# Patient Record
Sex: FEMALE | Race: WHITE | Employment: OTHER | URBAN - METROPOLITAN AREA
[De-identification: names, ages, dates, MRNs, and addresses within clinical notes are randomized per-mention and may not be internally consistent; named-entity substitution may affect disease eponyms.]

---

## 2017-01-04 ENCOUNTER — THERAPY VISIT (OUTPATIENT)
Dept: OCCUPATIONAL THERAPY | Facility: CLINIC | Age: 72
End: 2017-01-04
Payer: COMMERCIAL

## 2017-01-04 DIAGNOSIS — M25.631 WRIST STIFFNESS, RIGHT: Primary | ICD-10-CM

## 2017-01-04 DIAGNOSIS — M25.531 RIGHT WRIST PAIN: ICD-10-CM

## 2017-01-04 PROCEDURE — 97022 WHIRLPOOL THERAPY: CPT | Mod: GO | Performed by: OCCUPATIONAL THERAPIST

## 2017-01-04 PROCEDURE — 97140 MANUAL THERAPY 1/> REGIONS: CPT | Mod: GO | Performed by: OCCUPATIONAL THERAPIST

## 2017-01-04 NOTE — PROGRESS NOTES
SOAP Note - Hand Therapy - Objective Information    Current Date:  2017  Referring Physician: Dr. Hakan Samayoa Marin is a 71 year old right hand dominant female.    Diagnosis:Right Distal Radius Fracture  DOI:  10/24/16  DOS:  10/27/16  Procedure: ORIF of Right Distal Radius Fracture  Post: 7w 5d    Patient reports symptoms of pain, stiffness/loss of motion, weakness/loss of strength and edema of the right wrist which occurred due to a fall in her driveway.    S:  Subjective changes as noted by patient:  I think I have more motion  Functional changes noted by patient: less difficulty with some household tasks    S: Most of it seems to be due to the scar tissue area there  O:  Pain:    VAS(0-10) 16   At Rest:  0-210 0/10    With Use:  3/10 3/10 3/10     Report of Pain:  Location: right wrist and hand   Description:  Burning, Sharp  Frequency: Occasional  Duration:  Worse in pm.  Exacerbated by: Lifting, gripping, twisting, pulling,   Relieved by:  rest, massage  Progression: gradually improving    ROM:  Left Right Right Right      Wrist AROM  (PROM) 16      70 Ext 45 60 65      85 Flex 40 60 60        35 RD 20 20       50 UD 20 40       80 Sup 65 80       90 Pron 60 75         Strength:   (Measured in pounds)   16      Trials Right Left Right Left Right Left  Right Left   1  2  3  Av  14  15  16 59  45  47  47           Lat Pinch 16      Trials Right Left Right Left Right Left  Right Left   1  2  3  Av 12           3 Pt.  Pinch 16      Trials Right Left Right Left Right Left  Right Left   1  2  3  Av 11           Edema (circumference)   Distal Wrist Crease 16                Right 16                 Left 14.8          Edema:  []     None   [x]     Mild    []     Moderate      []     Severe                  Location: Volar MCP and P1     Scar:  []    NA           [x]  Adherent      []   Non-adherent       []   Raised      []   Flattened         Volar wrist,  Hypersensitive    Palpation:  []     Normal        [x]   Tender       []  Mild         [x]   Moderate []   Severe     Location: Distal wrist and CMC joint  Assessment  Pt progressing and returning to light functional use.  Edema is still noted across volar MCP and P1, lymphedema massage started on HP.   Please refer to the daily flowsheet for treatment provided today.     Home Program:   Overhead fisting  Warmth  Scar mobilization  AROM of wrist and forearm all planes  PROM for wrist E/F and S/P   strengthening  Avoid activities that exacerbate pain   Volar hand lymph massage    Next Visit:   Fluido  A/PROM of wrist  Strengthening as tolerated      Please see the documentation flowsheet for the details of todays treatment session and the Goal flowsheet for goal details.

## 2017-01-10 ENCOUNTER — THERAPY VISIT (OUTPATIENT)
Dept: OCCUPATIONAL THERAPY | Facility: CLINIC | Age: 72
End: 2017-01-10
Payer: COMMERCIAL

## 2017-01-10 DIAGNOSIS — M25.631 WRIST STIFFNESS, RIGHT: ICD-10-CM

## 2017-01-10 DIAGNOSIS — M25.531 RIGHT WRIST PAIN: ICD-10-CM

## 2017-01-10 DIAGNOSIS — S52.501D NONDISPLACED FRACTURE OF DISTAL END OF RIGHT RADIUS WITH ROUTINE HEALING, SUBSEQUENT ENCOUNTER: Primary | ICD-10-CM

## 2017-01-10 PROCEDURE — 97022 WHIRLPOOL THERAPY: CPT | Mod: GO | Performed by: OCCUPATIONAL THERAPIST

## 2017-01-10 PROCEDURE — 97110 THERAPEUTIC EXERCISES: CPT | Mod: GO | Performed by: OCCUPATIONAL THERAPIST

## 2017-01-10 PROCEDURE — 97140 MANUAL THERAPY 1/> REGIONS: CPT | Mod: GO | Performed by: OCCUPATIONAL THERAPIST

## 2017-01-10 NOTE — PROGRESS NOTES
SOAP note objective information for 1/10/2017:    ROM:  Left Right Right Right Right   Wrist AROM  (PROM) 12/20/16 12/27/16 1/04/17 1/10/17   70 Ext 45 60 65 60  68after Tx   85 Flex 40 60 60   55  62after Tx   35 RD 20 20  20   50 UD 20 40  30   80 Sup 65 80  80   90 Pron 60 75  85     Home Program:   Warmth for stiffness  Scar mobilization  Scar pad with tubigrip sleeve sleeping  AROM of wrist and forearm all planes  PROM for wrist E/F    strengthening  Wrist E/F isotonics    Next Visit:   Continue fluidotherapy, scar/joint mobs, ROM, and strengthening  Assess response to wrist strengthening  Taper to HEP as ROM continues to improve    Please refer to the daily flowsheet for treatment today, total treatment time and time spent performing 1:1 timed codes.

## 2017-01-11 ENCOUNTER — RADIANT APPOINTMENT (OUTPATIENT)
Dept: GENERAL RADIOLOGY | Facility: CLINIC | Age: 72
End: 2017-01-11
Attending: ORTHOPAEDIC SURGERY
Payer: COMMERCIAL

## 2017-01-11 ENCOUNTER — OFFICE VISIT (OUTPATIENT)
Dept: ORTHOPEDICS | Facility: CLINIC | Age: 72
End: 2017-01-11
Payer: COMMERCIAL

## 2017-01-11 VITALS — HEIGHT: 63 IN | RESPIRATION RATE: 16 BRPM | BODY MASS INDEX: 22.5 KG/M2 | WEIGHT: 127 LBS

## 2017-01-11 DIAGNOSIS — S52.501D CLOSED FRACTURE OF LOWER END OF RIGHT RADIUS WITH ROUTINE HEALING: ICD-10-CM

## 2017-01-11 DIAGNOSIS — S52.591D OTHER CLOSED FRACTURE OF DISTAL END OF RIGHT RADIUS WITH ROUTINE HEALING, SUBSEQUENT ENCOUNTER: Primary | ICD-10-CM

## 2017-01-11 PROCEDURE — 99024 POSTOP FOLLOW-UP VISIT: CPT | Performed by: ORTHOPAEDIC SURGERY

## 2017-01-11 PROCEDURE — 73110 X-RAY EXAM OF WRIST: CPT | Mod: RT

## 2017-01-11 ASSESSMENT — PAIN SCALES - GENERAL: PAINLEVEL: NO PAIN (0)

## 2017-01-11 NOTE — NURSING NOTE
"Chief Complaint   Patient presents with     Surgical Followup     Right distal radius fx - ORIF. DOS 10/27/16, 11 week PO. Patient states her wrist is doing good, making good progress. She just has some irritation around the scare, denies any pain. A little stiffness now and then, nothing major. She is going to hand therapy.        Initial Resp 16  Ht 1.6 m (5' 3\")  Wt 57.607 kg (127 lb)  BMI 22.50 kg/m2 Estimated body mass index is 22.5 kg/(m^2) as calculated from the following:    Height as of this encounter: 1.6 m (5' 3\").    Weight as of this encounter: 57.607 kg (127 lb).  BP completed using cuff size: NA (Not Taken)  Kimmy Damon Certified Medical Assistant    "

## 2017-01-11 NOTE — PATIENT INSTRUCTIONS
Please remember to call and schedule a follow up appointment if one was recommended at your earliest convenience.  Orthopedics CLINIC HOURS TELEPHONE NUMBER   Dr. Hakan Oneal  Certified Medical Assistant   Monday & Wednesday   8am - 5pm  Thursday 1pm - 5pm  Friday 8am -11:30am Specialty schedulers:   [phone] to schedule your surgery.  Main Clinic:   [phone] to make an appointment with any provider.    Urgent Care locations:    [facility] Monday-Friday Closed  Saturday-Sunday 9am-5pm      Monday-Friday 12pm - 8pm  Saturday-Sunday 9am-5pm [phone]     If SURGERY has been recommended, please call our Specialty Schedulers at [phone] to schedule your procedure.    If you need a medication refill, please contact your pharmacy. Please allow 3 business days for your refill to be completed.    If an MRI or CT scan has been recommended, please call [facility] Schedulers at [phone] to schedule your appointment.  Use [employer] (secure e-mail communication and access to your chart) to send a message or to make an appointment. Please ask how you can sign up for [employer].  Your care team's suggested websites for health information:   [email or web address] : Up to date and easily searchable information on multiple topics.   [email or web address] : [place] dept of heat, public health issues in [place], N1N1

## 2017-01-11 NOTE — PROGRESS NOTES
CHIEF COMPLAINT:   Chief Complaint   Patient presents with     Surgical Followup     Right distal radius fx - ORIF. DOS 10/27/16, 11 week PO. Patient states her wrist is doing good, making good progress. She just has some irritation around the scare, denies any pain. A little stiffness now and then, nothing major. She is going to hand therapy.        SURGICAL PROCEDURE: Open reduction internal fixation - right distal radius fracture ([facility])  DATE OF PROCEDURE: 10/27/16      HISTORY OF PRESENT ILLNESS    Danita Funes is a 71 year old female seen for postoperative follow-up of an ORIF of the right distal radius fracture that occurred 11 weeks ago. Since surgery, she has had little pain. Today, patient states her wrist is doing well, and making is good progress. She has some irritation around the scar, but has no pain. She does have a little stiffness in the morning, every now and then, however it is nothing major. Patient is going to hand therapy, which has been going well. Denies fevers, chills, night sweats. No wound problems.  Denies numbness or tingling. No longer using wrist brace.    Present symptoms: no pain. Minimal to no swelling. Some sensitivity around incision.  Pain severity: 0/10  Pain quality: dull  Frequency of symptoms: intermittent  Exacerbating Factors: rotating wrist  Relieving Factors: rest  Night Pain: No  Pain while at rest: No   Associated numbness or tingling: No     Orthopedic PMH: ORIF of right distal radius 10/27/2016    Other PMH:  has a past medical history of Diverticulitis of colon (2012); Anxiety disorder; and PONV (postoperative nausea and vomiting).  Patient Active Problem List    Diagnosis Date Noted     Distal radius fracture 12/20/2016     Priority: Medium     Wrist stiffness, right 12/20/2016     Priority: Medium     Right wrist pain 12/20/2016     Priority: Medium     Closed fracture of lower end of right radius with routine healing 12/07/2016     Priority: Medium      "CARDIOVASCULAR SCREENING; LDL GOAL LESS THAN 160 04/16/2013     Priority: Medium     Ovarian cyst 06/14/2012     Priority: Medium     Problem list name updated by automated process. Provider to review       Panic disorder with agoraphobia 06/01/2009     Priority: Medium       Medications:   Current outpatient prescriptions:      Acetaminophen (TYLENOL EXTRA STRENGTH PO), 2 tabs every 6 hours, Disp: , Rfl:      ClonazePAM (KLONOPIN) 1 MG disintegrating tablet, Take 0.5 mg by mouth 2 times daily as needed., Disp: , Rfl:     Allergies: No Known Allergies    REVIEW OF SYSTEMS:  CONSTITUTIONAL:NEGATIVE for fever, chills, change in weight  INTEGUMENTARY/SKIN: NEGATIVE for worrisome rashes, moles or lesions  MUSCULOSKELETAL:See HPI above  NEURO: NEGATIVE for weakness, dizziness or paresthesias      PHYSICAL EXAM:  Resp 16  Ht 1.6 m (5' 3\")  Wt 57.607 kg (127 lb)  BMI 22.50 kg/m2   GENERAL APPEARANCE: healthy, alert, no distress  SKIN: no suspicious lesions or rashes  NEURO: Normal strength and tone, mentation intact and speech normal  PSYCH:  mentation appears normal and affect normal/bright  RESPIRATORY: No increased work of breathing.      right UPPER EXTREMITY:  Inspection: no swelling. No abnormal skin discoloration.  Wound / Incision: healed  Mild diffuse tender to palpation over the volar wrist at incision, dorsal aspect of DRUJ.  Strength: grossly intact  Able to make full fist, no discomfort with gentle wrist range of motion.   Range of motion: Extension 60 degrees, Flexion 55 degrees    There is no maceration of the skin. No erythema.  There is no deformity in the area.    Sensation intact to light touch in median, radial, ulnar and axillary nerve distributions  Palpable 2+ radial pulse, brisk capillary refill to all fingers, wwp  Intact epl fpl fdp edc wrist flexion/extension biceps triceps deltoid      X-RAY:  3 views right distal radius from 1/11/2017 were reviewed in clinic today. On my review, volar plate " fixation across healed, reduced, distal radius fracture with improved alignment from injury. Fracture lucency not visible.      Impression: 71 year old female 11 weeks postoperative open-reduction and internal fixation of the right distal radius, doing well.    Plan:   * glad to hear doing well  * xrays reviewed, showing further healing. Essentially complete.    * Rest  * Activity modification - avoid activities that aggravate symptoms.  * Vitamin E oil deep massage to break down scar tissue  * NSAIDS - regular use for inflammation, with food, as long as no contra-indications. Please discuss with pcp if needed.  * Ice twice daily to three times daily.  * Immobilization - none.  * Elevation of extremity to reduce swelling as needed.  * Continue hand therapy until finished, then regular home exercise program. Expect continued improvements up to a year.  * Tylenol as needed for pain.  * Calcium and vitamin D supplements for osteoporosis.   * Return to clinic as needed.    This document serves as a record of the services and decisions personally performed and made by León Mcclendon MD. It was created on his behalf by Sonya Smith, a trained medical scribe. The creation of this document is based the provider's statements to the medical scribe.    Scribe Sonya Smith 1:19 PM 1/11/2017     León Mcclendon M.D., M.S.  Dept. of Orthopaedic Surgery  [facility]

## 2017-01-11 NOTE — MR AVS SNAPSHOT
After Visit Summary   1/11/2017    Danita Funes    MRN: [id number]           Patient Information     Date Of Birth          1945        Visit Information        Provider Department      1/11/2017 1:15 PM León Mcclendon MD [facility]        Today's Diagnoses     Closed fracture of lower end of right radius with routine healing    -  1       Care Instructions    Please remember to call and schedule a follow up appointment if one was recommended at your earliest convenience.  Orthopedics CLINIC HOURS TELEPHONE NUMBER   Dr. Hakan Oneal  Certified Medical Assistant   Monday & Wednesday   8am - 5pm  Thursday 1pm - 5pm  Friday 8am -11:30am Specialty schedulers:   [phone] to schedule your surgery.  Main Clinic:   [phone] to make an appointment with any provider.    Urgent Care locations:    [facility] Monday-Friday Closed  Saturday-Sunday 9am-5pm      Monday-Friday 12pm - 8pm  Saturday-Sunday 9am-5pm [phone]     If SURGERY has been recommended, please call our Specialty Schedulers at [phone] to schedule your procedure.    If you need a medication refill, please contact your pharmacy. Please allow 3 business days for your refill to be completed.    If an MRI or CT scan has been recommended, please call [facility] Schedulers at [phone] to schedule your appointment.  Use [employer] (secure e-mail communication and access to your chart) to send a message or to make an appointment. Please ask how you can sign up for [employer].  Your care team's suggested websites for health information:   Www.[email or web address] : Up to date and easily searchable information on multiple topics.   Www.[email or web address].[place].[email or web address] : [place] dept of heat, public health issues in [place], N1N1            Follow-ups after your visit        Your next 10 appointments already scheduled     Jan 11, 2017  1:15 PM   Return Visit with León  "[facility]    [address] [address]   [phone]            Jan 17, 2017  2:00 PM   DIETER Hand with Jenni Dior, OT   [facility]    [address]  [address]  [place] [id number] [phone]            Jan 25, 2017  2:00 PM   DIETER Hand with Jenni Dior, OT   [facility] ([facility])    [address]  [address]  [place] [id number] [phone]              Future tests that were ordered for you today     Open Future Orders        Priority Expected Expires Ordered    X-ray rt Wrist G/E 3 vws* Routine 1/11/2017 1/11/2018 1/11/2017            Who to contact     If you have questions or need follow up information about today's clinic visit or your schedule please contact [facility] directly at [phone].  Normal or non-critical lab and imaging results will be communicated to you by MyChart, letter or phone within 4 business days after the clinic has received the results. If you do not hear from us within 7 days, please contact the clinic through enEvolvhart or phone. If you have a critical or abnormal lab result, we will notify you by phone as soon as possible.  Submit refill requests through [employer] or call your pharmacy and they will forward the refill request to us. Please allow 3 business days for your refill to be completed.          Additional Information About Your Visit        enEvolvhart Information     [employer] lets you send messages to your doctor, view your test results, renew your prescriptions, schedule appointments and more. To sign up, go to [email or web address] . Click on \"Log in\" on the left side of the screen, which will take you to the Welcome page. Then click on \"Sign up Now\" on the right side of the page.     You will be asked to enter the access code listed below, as well as some " "personal information. Please follow the directions to create your username and password.     Your access code is: [email or web address]  Expires: 2017  2:19 PM     Your access code will  in 90 days. If you need help or a new code, please call your [place] clinic or [phone].        Care EveryWhere ID     This is your Care EveryWhere ID. This could be used by other organizations to access your [facility] medical records  [id number]        Your Vitals Were     Respirations Height BMI (Body Mass Index)             16 1.6 m (5' 3\") 22.50 kg/m2          Blood Pressure from Last 3 Encounters:   10/27/16 137/79   10/26/16 136/82   10/24/16 144/80    Weight from Last 3 Encounters:   17 57.607 kg (127 lb)   16 57.244 kg (126 lb 3.2 oz)   16 57.063 kg (125 lb 12.8 oz)               Primary Care Provider Office Phone # Fax #    Adrianne [facility] [phone]       [facility] [address] [address] [facility] [address]        Thank you!     Thank you for choosing [facility]  for your care. Our goal is always to provide you with excellent care. Hearing back from our patients is one way we can continue to improve our services. Please take a few minutes to complete the written survey that you may receive in the mail after your visit with us. Thank you!             Your Updated Medication List - Protect others around you: Learn how to safely use, store and throw away your medicines at www.disposemymeds.org.          This list is accurate as of: 17  1:05 PM.  Always use your most recent med list.                   Brand Name Dispense Instructions for use    clonazePAM 1 MG ODT tab    klonoPIN     Take 0.5 mg by mouth 2 times daily as needed.       TYLENOL EXTRA STRENGTH PO      2 tabs every 6 hours         "

## 2017-01-17 ENCOUNTER — THERAPY VISIT (OUTPATIENT)
Dept: OCCUPATIONAL THERAPY | Facility: CLINIC | Age: 72
End: 2017-01-17
Payer: COMMERCIAL

## 2017-01-17 DIAGNOSIS — M25.531 RIGHT WRIST PAIN: ICD-10-CM

## 2017-01-17 DIAGNOSIS — S52.501D NONDISPLACED FRACTURE OF DISTAL END OF RIGHT RADIUS WITH ROUTINE HEALING, SUBSEQUENT ENCOUNTER: Primary | ICD-10-CM

## 2017-01-17 DIAGNOSIS — M25.631 WRIST STIFFNESS, RIGHT: ICD-10-CM

## 2017-01-17 PROCEDURE — 97022 WHIRLPOOL THERAPY: CPT | Mod: GO | Performed by: OCCUPATIONAL THERAPIST

## 2017-01-17 PROCEDURE — 97110 THERAPEUTIC EXERCISES: CPT | Mod: GO | Performed by: OCCUPATIONAL THERAPIST

## 2017-01-17 PROCEDURE — 97140 MANUAL THERAPY 1/> REGIONS: CPT | Mod: GO | Performed by: OCCUPATIONAL THERAPIST

## 2017-01-17 NOTE — PROGRESS NOTES
SOAP note objective information for 2017:    Referring Physician: Dr. Hakan Ballnehemias Funes is a 71 year old right hand dominant female.    Diagnosis:Right Distal Radius Fracture  DOI:  10/24/16  DOS:  10/27/16  Procedure: ORIF of Right Distal Radius Fracture  Post: 11w 5d    Patient reports symptoms of pain, stiffness/loss of motion, weakness/loss of strength and edema of the right wrist which occurred due to a fall in her driveway.    ROM:  Left Right Right Right Right Right   Wrist AROM  (PROM) 12/20/16 12/27/16 1/04/17 1/10/17 1/17/16   70 Ext 45 60 65 60  68after Tx 72 post   85 Flex 40 60 60   55  62after Tx 70 post   35 RD 20 20  20    50 UD 20 40  30 43 post   80 Sup 65 80  80    90 Pron 60 75  85      Strength:   (Measured in pounds)   16     Trials Right Left Right Left Right Left  Right Left   1  2  3  Av  14  15  16 59  45  47  47 25  28  32  28          Lat Pinch 16     Trials Right Left Right Left Right Left  Right Left   1  2  3  Av 12 11          3 Pt.  Pinch 16     Trials Right Left Right Left Right Left  Right Left   1  2  3  Av 11 9          Edema (circumference)   Distal Wrist Crease 16               Right 16 15.6                Left 14.8          Edema:  []     None   [x]     Mild    []     Moderate      []     Severe                  Location: (R) wrist     Scar:  []    NA           [x]  Adherent      []   Non-adherent       []   Raised     []   Flattened         Volar wrist,  Hypersensitive    Palpation:  []     Normal        [x]   Tender       []  Mild         [x]   Moderate []   Severe     Location: Distal wrist and CMC joint    Home Program:   Warmth for stiffness  Scar mobilization  Scar pad with tubigrip sleeve sleeping  AROM of wrist and forearm all planes  PROM for wrist E/F    strengthening Isometric due to possible trigger developing  Wrist E/F isotonics    Next Visit:   Continue fluidotherapy,  scar/joint mobs, ROM, and strengthening  Assess response to wrist strengthening  Taper to HEP as ROM continues to improve    Please refer to the daily flowsheet for treatment today, total treatment time and time spent performing 1:1 timed codes.

## 2017-01-25 ENCOUNTER — THERAPY VISIT (OUTPATIENT)
Dept: OCCUPATIONAL THERAPY | Facility: CLINIC | Age: 72
End: 2017-01-25
Payer: COMMERCIAL

## 2017-01-25 DIAGNOSIS — M25.631 WRIST STIFFNESS, RIGHT: ICD-10-CM

## 2017-01-25 DIAGNOSIS — M25.531 RIGHT WRIST PAIN: ICD-10-CM

## 2017-01-25 DIAGNOSIS — S52.501D NONDISPLACED FRACTURE OF DISTAL END OF RIGHT RADIUS WITH ROUTINE HEALING, SUBSEQUENT ENCOUNTER: Primary | ICD-10-CM

## 2017-01-25 PROCEDURE — 97140 MANUAL THERAPY 1/> REGIONS: CPT | Mod: GO | Performed by: OCCUPATIONAL THERAPIST

## 2017-01-25 PROCEDURE — 97022 WHIRLPOOL THERAPY: CPT | Mod: GO | Performed by: OCCUPATIONAL THERAPIST

## 2017-01-25 PROCEDURE — 97110 THERAPEUTIC EXERCISES: CPT | Mod: GO | Performed by: OCCUPATIONAL THERAPIST

## 2017-01-25 NOTE — PROGRESS NOTES
SOAP note objective information for 2017:    Referring Physician: Dr. Hakan Samayoa Marin is a 71 year old right hand dominant female.    Diagnosis:Right Distal Radius Fracture  DOI:  10/24/16  DOS:  10/27/16  Procedure: ORIF of Right Distal Radius Fracture  Post: 12w 6d    Patient reports symptoms of pain, stiffness/loss of motion, weakness/loss of strength and edema of the right wrist which occurred due to a fall in her driveway.    ROM:  Left Right Right Right Right Right Right   Wrist AROM  (PROM) 12/20/16 12/27/16 1/04/17 1/10/17 1/17/16 1/25/17   70 Ext 45 60 65 60  68after Tx 72 post 75   85 Flex 40 60 60   55  62after Tx 70 post 70 post   35 RD 20 20  20     50 UD 20 40  30 43 post 45 post   80 Sup 65 80  80     90 Pron 60 75  85       Strength:   (Measured in pounds)   16     Trials Right Left Right Left Right Left  Right Left   1  2  3  Av  14  15  16 59  45  47  47 25  28  32  28          Lat Pinch 16     Trials Right Left Right Left Right Left  Right Left   1  2  3  Av 12 11          3 Pt.  Pinch 16     Trials Right Left Right Left Right Left  Right Left   1  2  3  Av 11 9          Edema (circumference)   Distal Wrist Crease 16               Right 16 15.6                Left 14.8          Edema:  []     None   [x]     Mild    []     Moderate      []     Severe                  Location: (R) wrist     Scar:  []    NA           [x]  Adherent      []   Non-adherent       []   Raised     []   Flattened         Volar wrist,  Hypersensitive    Palpation:  []     Normal        [x]   Tender       []  Mild         [x]   Moderate []   Severe     Location: Distal wrist and CMC joint    Home Program:   Warmth for stiffness  Scar mobilization  Scar pad with tubigrip sleeve sleeping  AROM of wrist and forearm all planes  PROM for wrist E/F    strengthening Isometric due to possible trigger developing  Wrist E/F isotonics    Next  Visit: 2 weeks  Continue fluidotherapy, scar/joint mobs, ROM, and strengthening  Assess response to wrist strengthening  Taper to HEP as ROM continues to improve    Please refer to the daily flowsheet for treatment today, total treatment time and time spent performing 1:1 timed codes.

## 2017-05-10 ENCOUNTER — TELEPHONE (OUTPATIENT)
Dept: FAMILY MEDICINE | Facility: CLINIC | Age: 72
End: 2017-05-10

## 2017-05-10 NOTE — TELEPHONE ENCOUNTER
5/10/2017     Call Regarding Preventive Health Screening Mammogram, LDL and Physical  Attempt 1     Message on voicemail   Comments:         Outreach   CHRISTINA

## 2017-06-27 NOTE — TELEPHONE ENCOUNTER
6/27/2017    Attempt 2    Contacted patient in regards to scheduling VIP mammogram  Message on voicemail     Patient is also due for - Preventive Health Screening LDL and Physical    Comments:       Outreach   KV

## 2017-06-30 NOTE — TELEPHONE ENCOUNTER
Attempt 3     Contacted patient in regards to scheduling VIP mammogram  Message on voicemail      Patient is also due for - Preventive Health Screening LDL and Physical     Comments:         Outreach   Shira Lam

## 2017-07-10 NOTE — PROGRESS NOTES
Discharge Summary - Hand Therapy    Patient did not return to therapy. Please refer to the last SOAP note for objective details and goal achievement.  We will assume that patient's goals were met.    D/C from [facility].

## 2017-12-06 ENCOUNTER — TELEPHONE (OUTPATIENT)
Dept: FAMILY MEDICINE | Facility: CLINIC | Age: 72
End: 2017-12-06

## 2017-12-06 NOTE — TELEPHONE ENCOUNTER
Patient is due for a mammogram. Left message for patient to call back  Mansi Hicks Scheduling at [phone]. Encounter sent to reception team to send letter to home address.     If patient returns call, please help them schedule a mammogram.     Thank you,    [facility]

## 2017-12-06 NOTE — LETTER
December 6, 2017      Danita Funes  [address]  [place] [address]    Dear Danita Funes,     At [facility]  we care about your health and are committed to providing quality patient care, which includes staying current on preventative cancer screenings.  You can increase your chances of finding and treating cancers through regular screenings.      Our records show that you are due for the following screening:    Mammogram for breast cancer   Recommended every 1-2 years for women age 50 and older  Mammograms help detect breast cancer, which is the most common cancer among women in the [place].  You may need to start having mammograms earlier and more often if you have had breast cancer, breast problems, or a family history of breast cancer.     You may contact us by phone at [facility] at [phone] to schedule your mammogram at your earliest convenience.    If you have already had a mammogram at another facility, please call us so that we may update your chart.      Your partners in health,      Quality Committee   [facility]

## 2018-02-06 ENCOUNTER — OFFICE VISIT (OUTPATIENT)
Dept: URGENT CARE | Facility: URGENT CARE | Age: 73
End: 2018-02-06
Payer: COMMERCIAL

## 2018-02-06 VITALS
WEIGHT: 122 LBS | SYSTOLIC BLOOD PRESSURE: 127 MMHG | BODY MASS INDEX: 21.61 KG/M2 | TEMPERATURE: 100.4 F | OXYGEN SATURATION: 95 % | HEART RATE: 113 BPM | DIASTOLIC BLOOD PRESSURE: 78 MMHG

## 2018-02-06 DIAGNOSIS — R09.81 NASAL CONGESTION: ICD-10-CM

## 2018-02-06 DIAGNOSIS — H66.002 ACUTE SUPPURATIVE OTITIS MEDIA OF LEFT EAR WITHOUT SPONTANEOUS RUPTURE OF TYMPANIC MEMBRANE, RECURRENCE NOT SPECIFIED: Primary | ICD-10-CM

## 2018-02-06 PROCEDURE — 99213 OFFICE O/P EST LOW 20 MIN: CPT | Performed by: PHYSICIAN ASSISTANT

## 2018-02-06 RX ORDER — AMOXICILLIN 875 MG
875 TABLET ORAL 2 TIMES DAILY
Qty: 20 TABLET | Refills: 0 | Status: SHIPPED | OUTPATIENT
Start: 2018-02-06 | End: 2018-02-16

## 2018-02-06 NOTE — PROGRESS NOTES
SUBJECTIVE:   Danita Funes is a 72 year old female who presents to clinic today for the following health issues:    RESPIRATORY SYMPTOMS      Duration: one week- worsening    Description  Cough (productive)  and congestion, red eyes, left ear pain, left sided neck pain, headache    Severity: moderate    Accompanying signs and symptoms: None    History (predisposing factors):   has had similar sx for about two weeks.     Precipitating or alleviating factors: None    Therapies tried and outcome:  Rest and fluids, musinex, cold-eez, tylenol- some relief.          No Known Allergies    Past Medical History:   Diagnosis Date     Anxiety disorder      Diverticulitis of colon 2012     PONV (postoperative nausea and vomiting)          Current Outpatient Prescriptions on File Prior to Visit:  Acetaminophen (TYLENOL EXTRA STRENGTH PO) 2 tabs every 6 hours   ClonazePAM (KLONOPIN) 1 MG disintegrating tablet Take 0.5 mg by mouth 2 times daily as needed.     No current facility-administered medications on file prior to visit.     Social History   Substance Use Topics     Smoking status: Never Smoker     Smokeless tobacco: Never Used     Alcohol use Yes      Comment: occasional       ROS:  CONSTITUTIONAL: Negative for fatigue or fever.  EYES: Negative for eye problems.  ENT: As above.  RESP: As above.  CV: Negative for chest pains.  GI: Negative for vomiting.  MUSCULOSKELETAL:  Negative for significant muscle or joint pains.  NEUROLOGIC: Negative for headaches.  SKIN: Negative for rash.    OBJECTIVE:  /78 (BP Location: Left arm, Patient Position: Chair, Cuff Size: Adult Regular)  Pulse 113  Temp 100.4  F (38  C) (Tympanic)  Wt 122 lb (55.3 kg)  SpO2 95%  Breastfeeding? No  BMI 21.61 kg/m2  GENERAL APPEARANCE: Healthy, alert and no distress.  EYES:Conjunctiva/sclera with mild injection.  EARS: No cerumen.   Ear canals w/o erythema.  L TM red and bulging. R TM clear.    NOSE/MOUTH: Nose without ulcers,  erythema or lesions.  SINUSES: No maxillary sinus tenderness.  THROAT: No erythema w/o tonsillar enlargement . No exudates.  NECK: Supple, nontender, no lymphadenopathy.  RESP: Lungs clear to auscultation - no rales, rhonchi or wheezes  CV: Regular rate and rhythm, normal S1 S2, no murmur noted.  NEURO: Awake, alert    SKIN: No rashes    ASSESSMENT:     ICD-10-CM    1. Acute suppurative otitis media of left ear without spontaneous rupture of tympanic membrane, recurrence not specified H66.002 amoxicillin (AMOXIL) 875 MG tablet   2. Nasal congestion R09.81        PLAN:  Lots of rest and fluids.  RTC if any worsening symptoms or if not improving.    Indy Stroud PA-C

## 2018-02-06 NOTE — NURSING NOTE
"Chief Complaint   Patient presents with     URI     Pt c/o URI for one week.        Initial /78 (BP Location: Left arm, Patient Position: Chair, Cuff Size: Adult Regular)  Pulse 113  Temp 100.4  F (38  C) (Tympanic)  Wt 122 lb (55.3 kg)  SpO2 95%  Breastfeeding? No  BMI 21.61 kg/m2 Estimated body mass index is 21.61 kg/(m^2) as calculated from the following:    Height as of 1/11/17: 5' 3\" (1.6 m).    Weight as of this encounter: 122 lb (55.3 kg).  Medication Reconciliation: complete     Manuela Torres CMA (AAMA)      "

## 2018-02-06 NOTE — MR AVS SNAPSHOT
"              After Visit Summary   2018    Danita Funes    MRN: [id number]           Patient Information     Date Of Birth          1945        Visit Information        Provider Department      2018 3:20 PM Indy Stroud PA-C [facility]        Today's Diagnoses     Acute suppurative otitis media of left ear without spontaneous rupture of tympanic membrane, recurrence not specified    -  1    Nasal congestion           Follow-ups after your visit        Who to contact     If you have questions or need follow up information about today's clinic visit or your schedule please contact [facility] directly at [phone].  Normal or non-critical lab and imaging results will be communicated to you by MyChart, letter or phone within 4 business days after the clinic has received the results. If you do not hear from us within 7 days, please contact the clinic through MyChart or phone. If you have a critical or abnormal lab result, we will notify you by phone as soon as possible.  Submit refill requests through rubberit or call your pharmacy and they will forward the refill request to us. Please allow 3 business days for your refill to be completed.          Additional Information About Your Visit        MyChart Information     rubberit lets you send messages to your doctor, view your test results, renew your prescriptions, schedule appointments and more. To sign up, go to [email or web address] . Click on \"Log in\" on the left side of the screen, which will take you to the Welcome page. Then click on \"Sign up Now\" on the right side of the page.     You will be asked to enter the access code listed below, as well as some personal information. Please follow the directions to create your username and password.     Your access code is: [email or web address]  Expires: 2018  4:34 PM     Your access code will  in 90 days. If you need help or a new code, please call your " [facility] or [phone].        Care EveryWhere ID     This is your Care EveryWhere ID. This could be used by other organizations to access your [place] medical records  [id number]        Your Vitals Were     Pulse Temperature Pulse Oximetry Breastfeeding? BMI (Body Mass Index)       113 100.4  F (38  C) (Tympanic) 95% No 21.61 kg/m2        Blood Pressure from Last 3 Encounters:   02/06/18 127/78   10/27/16 137/79   10/26/16 136/82    Weight from Last 3 Encounters:   02/06/18 122 lb (55.3 kg)   01/11/17 127 lb (57.6 kg)   12/07/16 126 lb 3.2 oz (57.2 kg)              Today, you had the following     No orders found for display         Today's Medication Changes          These changes are accurate as of 2/6/18  4:34 PM.  If you have any questions, ask your nurse or doctor.               Start taking these medicines.        Dose/Directions    amoxicillin 875 MG tablet   Commonly known as:  AMOXIL   Used for:  Acute suppurative otitis media of left ear without spontaneous rupture of tympanic membrane, recurrence not specified   Started by:  Indy Stroud PA-C        Dose:  875 mg   Take 1 tablet (875 mg) by mouth 2 times daily   Quantity:  20 tablet   Refills:  0            Where to get your medicines      These medications were sent to [id number] - [place], [place] - [address] [facility] [address]  [address] [place] [address]     Phone:  [phone]     amoxicillin 875 MG tablet                Primary Care Provider Office Phone # Fax #    Adrianne Washington Cornell [phone] [address] [facility] [address]        Equal Access to Services     LINA TOMLIN : Hadfrancia Grant, jamieda antonio, qaybta heidy bonilla. So [facility] [phone].    ATENCIÓN: Si habla español, tiene a carrion disposición servicios gratuitos de asistencia lingüística. Llame al [phone].    We comply with applicable federal  civil rights laws and [place] laws. We do not discriminate on the basis of race, color, national origin, age, disability, sex, sexual orientation, or gender identity.            Thank you!     Thank you for choosing [facility]  for your care. Our goal is always to provide you with excellent care. Hearing back from our patients is one way we can continue to improve our services. Please take a few minutes to complete the written survey that you may receive in the mail after your visit with us. Thank you!             Your Updated Medication List - Protect others around you: Learn how to safely use, store and throw away your medicines at www.disposemymeds.org.          This list is accurate as of 2/6/18  4:34 PM.  Always use your most recent med list.                   Brand Name Dispense Instructions for use Diagnosis    amoxicillin 875 MG tablet    AMOXIL    20 tablet    Take 1 tablet (875 mg) by mouth 2 times daily    Acute suppurative otitis media of left ear without spontaneous rupture of tympanic membrane, recurrence not specified       clonazePAM 1 MG ODT tab    klonoPIN     Take 0.5 mg by mouth 2 times daily as needed.        CULTURELLE PO           TYLENOL EXTRA STRENGTH PO      2 tabs every 6 hours

## 2018-02-12 ENCOUNTER — TELEPHONE (OUTPATIENT)
Dept: URGENT CARE | Facility: URGENT CARE | Age: 73
End: 2018-02-12

## 2018-02-12 NOTE — TELEPHONE ENCOUNTER
..Reason for Call:  Other     Detailed comments: Patient was seen a week ago tomorrow in urgent care, she was given AMOXICILLIN. Patient said no improvement in symptoms she need a call back on what she need to do or if a different script can be prescribed.    Phone Number Patient can be reached at: Home number on file [phone] (home)    Best Time: anytime    Can we leave a detailed message on this number? YES    Call taken on 2/12/2018 at 12:56 PM by Vida Joshi

## 2018-02-12 NOTE — TELEPHONE ENCOUNTER
Patient should follow up with her Primary for recheck or be seen back in UC if not better so we can take another look at it.    Please notify.    Indy Stroud PA-C

## 2018-02-12 NOTE — TELEPHONE ENCOUNTER
Pt has taken the first dose on 2/6 and is still taking it, has 8 tablets left.    She has not felt a difference, getting worse.  Pt reporting left ear plugged, can't hear thru it, left side of the neck is sore.    Please advise    Kelsie Aly RN

## 2018-02-12 NOTE — TELEPHONE ENCOUNTER
Pt updated on the below, no questions at this time, verbalized understanding.  Pt will finish ABX and follow up if has no improvement. Apt scheduled with PCP this Friday.  If ear infection resolves by Friday, pt will cancel an apt.  Kelsie Aly RN

## 2018-02-16 ENCOUNTER — TRANSFERRED RECORDS (OUTPATIENT)
Dept: HEALTH INFORMATION MANAGEMENT | Facility: CLINIC | Age: 73
End: 2018-02-16

## 2018-02-16 ENCOUNTER — OFFICE VISIT (OUTPATIENT)
Dept: FAMILY MEDICINE | Facility: CLINIC | Age: 73
End: 2018-02-16
Payer: COMMERCIAL

## 2018-02-16 VITALS
DIASTOLIC BLOOD PRESSURE: 72 MMHG | RESPIRATION RATE: 18 BRPM | HEIGHT: 63 IN | WEIGHT: 123.1 LBS | TEMPERATURE: 98.3 F | OXYGEN SATURATION: 98 % | HEART RATE: 107 BPM | SYSTOLIC BLOOD PRESSURE: 130 MMHG | BODY MASS INDEX: 21.81 KG/M2

## 2018-02-16 DIAGNOSIS — H66.002 ACUTE SUPPURATIVE OTITIS MEDIA OF LEFT EAR WITHOUT SPONTANEOUS RUPTURE OF TYMPANIC MEMBRANE, RECURRENCE NOT SPECIFIED: Primary | ICD-10-CM

## 2018-02-16 DIAGNOSIS — M54.2 CERVICALGIA: ICD-10-CM

## 2018-02-16 PROCEDURE — 99214 OFFICE O/P EST MOD 30 MIN: CPT | Performed by: FAMILY MEDICINE

## 2018-02-16 RX ORDER — AZITHROMYCIN 250 MG/1
TABLET, FILM COATED ORAL
Qty: 6 TABLET | Refills: 0 | Status: SHIPPED | OUTPATIENT
Start: 2018-02-16 | End: 2023-11-02

## 2018-02-16 NOTE — PROGRESS NOTES
"    SUBJECTIVE:   Danita Funes is a 73 year old female who presents to clinic today for the following health issues:      ED/UC Followup:    Facility:  [facility]  Date of visit: 2-6-18  Reason for visit: LT ear infection  Current Status: still having LT ear problem with hearing/plugged     SUBJECTIVE:  Here today in follow-up of left ear infection.  Initial illness started almost 3 weeks ago and she has had persistent discomfort and plugging of her left ear.  Was seen through urgent care last week and prescribed amoxicillin.  This really did not seem to do much in the last couple of days is actually developed a warm red rash.  No history of allergies to medication prior to that.  His ear still feels full and sometimes sore.  Also complains of intermittent neck pain times years.  Seems to be along the left side.  There is some popping and cracking with neck movement    Review of systems otherwise negative.  Past medical, family, and social history reviewed and updated in chart.    OBJECTIVE:  /72 (BP Location: Right arm, Patient Position: Sitting, Cuff Size: Adult Regular)  Pulse 107  Temp 98.3  F (36.8  C) (Oral)  Resp 18  Ht 1.588 m (5' 2.5\")  Wt 55.8 kg (123 lb 1.6 oz)  SpO2 98%  BMI 22.16 kg/m2  Alert, pleasant, upbeat, and in no apparent discomfort.  Right ear canal and TM clear  Left canal clear but TM is retracted with a milky fluid and air bubbles behind  Oropharynx is clear  Neck is smooth range of motion but there is some tenderness to palpation in the left occipital notch  S1 and S2 normal, no murmurs, clicks, gallops or rubs. Regular rate and rhythm. Chest is clear; no wheezes or rales. No edema or JVD.  Skin somewhat confluent hypervascular eruption throughout trunk  Past labs reviewed with the patient.     ASSESSMENT / PLAN:  (H66.002) Acute suppurative otitis media of left ear without spontaneous rupture of tympanic membrane, recurrence not specified  (primary encounter " diagnosis)  Comment: I suspect she has developed an allergy or reaction to the amoxicillin.  It is not fixing the issue anyway.  Will change to Zithromax and I instructed her on clearing her eustachian tubes.  Plan: azithromycin (ZITHROMAX) 250 MG tablet            (M54.2) Cervicalgia  Comment: Counseled on some stretches and exercise  Plan:     Follow up as needed   SDanika Cornell MD    (Chart documentation completed in part with Dragon voice-recognition software.  Even though reviewed some grammatical, spelling, and word errors may remain.)

## 2018-02-16 NOTE — PATIENT INSTRUCTIONS
To Clear Ears    Decongest with either/or:  1) afrin spray - works immediately  2) sudafed (either alone or on combo, such as Claritin-D, Zyrtec-D, etc.)  3) flonase, nasonex - work well - may take a few days to kick in    Valsalva Maneuvers (periodically as needed):  - tilt head to side  - plug nose  - gently blow  - repeat for other side

## 2018-02-16 NOTE — NURSING NOTE
"Chief Complaint   Patient presents with     Ear Problem       Initial /72 (BP Location: Right arm, Patient Position: Sitting, Cuff Size: Adult Regular)  Pulse 107  Temp 98.3  F (36.8  C) (Oral)  Resp 18  Ht 1.588 m (5' 2.5\")  Wt 55.8 kg (123 lb 1.6 oz)  SpO2 98%  BMI 22.16 kg/m2 Estimated body mass index is 22.16 kg/(m^2) as calculated from the following:    Height as of this encounter: 1.588 m (5' 2.5\").    Weight as of this encounter: 55.8 kg (123 lb 1.6 oz).  Medication Reconciliation: yusuf Martin        "

## 2018-02-16 NOTE — MR AVS SNAPSHOT
"              After Visit Summary   2/16/2018    Danita Funes    MRN: [id number]           Patient Information     Date Of Birth          1945        Visit Information        Provider Department      2/16/2018 9:40 AM Adrianne Cornell MD [facility]        Today's Diagnoses     Acute suppurative otitis media of left ear without spontaneous rupture of tympanic membrane, recurrence not specified    -  1    Cervicalgia          Care Instructions    To Clear Ears    Decongest with either/or:  1) afrin spray - works immediately  2) sudafed (either alone or on combo, such as Claritin-D, Zyrtec-D, etc.)  3) flonase, nasonex - work well - may take a few days to kick in    Valsalva Maneuvers (periodically as needed):  - tilt head to side  - plug nose  - gently blow  - repeat for other side           Follow-ups after your visit        Who to contact     If you have questions or need follow up information about today's clinic visit or your schedule please contact [facility] directly at [phone].  Normal or non-critical lab and imaging results will be communicated to you by Open Labshart, letter or phone within 4 business days after the clinic has received the results. If you do not hear from us within 7 days, please contact the clinic through Open Labshart or phone. If you have a critical or abnormal lab result, we will notify you by phone as soon as possible.  Submit refill requests through Fly Media or call your pharmacy and they will forward the refill request to us. Please allow 3 business days for your refill to be completed.          Additional Information About Your Visit        Open Labshart Information     Fly Media lets you send messages to your doctor, view your test results, renew your prescriptions, schedule appointments and more. To sign up, go to [email or web address] . Click on \"Log in\" on the left side of the screen, which will take you to the Welcome page. Then click on \"Sign up " "Now\" on the right side of the page.     You will be asked to enter the access code listed below, as well as some personal information. Please follow the directions to create your username and password.     Your access code is: [email or web address]  Expires: 2018  4:34 PM     Your access code will  in 90 days. If you need help or a new code, please call your [facility] or [phone].        Care EveryWhere ID     This is your Care EveryWhere ID. This could be used by other organizations to access your [place] medical records  [id number]        Your Vitals Were     Pulse Temperature Respirations Height Pulse Oximetry BMI (Body Mass Index)    107 98.3  F (36.8  C) (Oral) 18 1.588 m (5' 2.5\") 98% 22.16 kg/m2       Blood Pressure from Last 3 Encounters:   18 130/72   18 127/78   10/27/16 137/79    Weight from Last 3 Encounters:   18 55.8 kg (123 lb 1.6 oz)   18 55.3 kg (122 lb)   17 57.6 kg (127 lb)              Today, you had the following     No orders found for display         Today's Medication Changes          These changes are accurate as of 18 10:02 AM.  If you have any questions, ask your nurse or doctor.               Start taking these medicines.        Dose/Directions    azithromycin 250 MG tablet   Commonly known as:  ZITHROMAX   Used for:  Acute suppurative otitis media of left ear without spontaneous rupture of tympanic membrane, recurrence not specified   Replaces:  amoxicillin 875 MG tablet   Started by:  Adrianne Cornell MD        Two tablets first day, then one tablet daily for four days.   Quantity:  6 tablet   Refills:  0         Stop taking these medicines if you haven't already. Please contact your care team if you have questions.     amoxicillin 875 MG tablet   Commonly known as:  AMOXIL   Replaced by:  azithromycin 250 MG tablet   Stopped by:  Adrianne Cornell MD                Where to get your medicines      These medications were sent " to [facility][id number] - [place], [place] - [address] [facility] [address]  [address] [facility] [address] [facility] [address]     Phone:  [phone]     azithromycin 250 MG tablet                Primary Care Provider Office Phone # Fax #    Adrianne Washington Cornell [phone] [address] [facility] [address]        Equal Access to Services     LINA TOMLIN : Hadii aad ku hadasho Soomaali, waaxda luqadaha, qaybta kaalmada adeegyada, waxay idiin hayaan adeeg kharash la'aan ah. So [facility] [phone].    ATENCIÓN: Si habla esphiro, tiene a carrion disposición servicios gratuitos de asistencia lingüística. [facility] [phone].    We comply with applicable federal civil rights laws and [place] laws. We do not discriminate on the basis of race, color, national origin, age, disability, sex, sexual orientation, or gender identity.            Thank you!     Thank you for choosing [facility]  for your care. Our goal is always to provide you with excellent care. Hearing back from our patients is one way we can continue to improve our services. Please take a few minutes to complete the written survey that you may receive in the mail after your visit with us. Thank you!             Your Updated Medication List - Protect others around you: Learn how to safely use, store and throw away your medicines at www.disposemymeds.org.          This list is accurate as of 2/16/18 10:02 AM.  Always use your most recent med list.                   Brand Name Dispense Instructions for use Diagnosis    azithromycin 250 MG tablet    ZITHROMAX    6 tablet    Two tablets first day, then one tablet daily for four days.    Acute suppurative otitis media of left ear without spontaneous rupture of tympanic membrane, recurrence not specified       clonazePAM 1 MG ODT tab    klonoPIN     Take 0.5 mg by mouth 2 times daily as needed.        CULTURELLE PO           TYLENOL EXTRA STRENGTH PO      2 tabs every 6 hours

## 2018-08-29 ENCOUNTER — TELEPHONE (OUTPATIENT)
Dept: FAMILY MEDICINE | Facility: CLINIC | Age: 73
End: 2018-08-29

## 2018-08-29 NOTE — TELEPHONE ENCOUNTER
8/29/2018    Attempt 1    Contacted patient in regards to scheduling VIP mammogram  Message on voicemail         Comments:       Outreach   Lurdes VANCE

## 2018-09-19 NOTE — TELEPHONE ENCOUNTER
9/19/2018    Call Regarding VIP Mammogram    Attempt 2    Message on voicemail    Patient is also due for:    Outreach   SV

## 2018-09-25 NOTE — TELEPHONE ENCOUNTER
9/25/2018    Attempt 3    Contacted patient in regards to scheduling VIP mammogram  Message on voicemail     Patient is also due for -     Comments:       Outreach   Fabi Montoya

## 2019-10-02 ENCOUNTER — DOCUMENTATION ONLY (OUTPATIENT)
Dept: FAMILY MEDICINE | Facility: CLINIC | Age: 74
End: 2019-10-02

## 2019-10-02 ENCOUNTER — OFFICE VISIT (OUTPATIENT)
Dept: FAMILY MEDICINE | Facility: CLINIC | Age: 74
End: 2019-10-02
Payer: COMMERCIAL

## 2019-10-02 VITALS
SYSTOLIC BLOOD PRESSURE: 121 MMHG | OXYGEN SATURATION: 97 % | TEMPERATURE: 98.2 F | HEIGHT: 64 IN | DIASTOLIC BLOOD PRESSURE: 79 MMHG | HEART RATE: 91 BPM | WEIGHT: 123 LBS | BODY MASS INDEX: 21 KG/M2 | RESPIRATION RATE: 16 BRPM

## 2019-10-02 DIAGNOSIS — Z13.1 SCREENING FOR DIABETES MELLITUS: ICD-10-CM

## 2019-10-02 DIAGNOSIS — Z13.6 CARDIOVASCULAR SCREENING; LDL GOAL LESS THAN 160: ICD-10-CM

## 2019-10-02 DIAGNOSIS — Z11.59 NEED FOR HEPATITIS C SCREENING TEST: ICD-10-CM

## 2019-10-02 DIAGNOSIS — F41.1 GAD (GENERALIZED ANXIETY DISORDER): ICD-10-CM

## 2019-10-02 DIAGNOSIS — E78.5 HYPERLIPIDEMIA LDL GOAL <130: Primary | ICD-10-CM

## 2019-10-02 DIAGNOSIS — Z13.6 CARDIOVASCULAR SCREENING; LDL GOAL LESS THAN 130: Primary | ICD-10-CM

## 2019-10-02 DIAGNOSIS — Z23 ENCOUNTER FOR IMMUNIZATION: ICD-10-CM

## 2019-10-02 DIAGNOSIS — Z12.31 ENCOUNTER FOR SCREENING MAMMOGRAM FOR BREAST CANCER: ICD-10-CM

## 2019-10-02 DIAGNOSIS — Z13.6 CARDIOVASCULAR SCREENING; LDL GOAL LESS THAN 160: Primary | ICD-10-CM

## 2019-10-02 DIAGNOSIS — Z00.00 ENCOUNTER FOR MEDICARE ANNUAL WELLNESS EXAM: Primary | ICD-10-CM

## 2019-10-02 LAB
ALBUMIN SERPL-MCNC: 4.3 G/DL (ref 3.4–5)
ALP SERPL-CCNC: 71 U/L (ref 40–150)
ALT SERPL W P-5'-P-CCNC: 17 U/L (ref 0–50)
ANION GAP SERPL CALCULATED.3IONS-SCNC: 8 MMOL/L (ref 3–14)
AST SERPL W P-5'-P-CCNC: 24 U/L (ref 0–45)
BILIRUB SERPL-MCNC: 0.8 MG/DL (ref 0.2–1.3)
BUN SERPL-MCNC: 15 MG/DL (ref 7–30)
CALCIUM SERPL-MCNC: 9.8 MG/DL (ref 8.5–10.1)
CHLORIDE SERPL-SCNC: 109 MMOL/L (ref 94–109)
CHOLEST SERPL-MCNC: 399 MG/DL
CO2 SERPL-SCNC: 23 MMOL/L (ref 20–32)
CREAT SERPL-MCNC: 1.06 MG/DL (ref 0.52–1.04)
GFR SERPL CREATININE-BSD FRML MDRD: 52 ML/MIN/{1.73_M2}
GLUCOSE SERPL-MCNC: 91 MG/DL (ref 70–99)
HDLC SERPL-MCNC: 59 MG/DL
LDLC SERPL CALC-MCNC: 306 MG/DL
NONHDLC SERPL-MCNC: 340 MG/DL
POTASSIUM SERPL-SCNC: 4.2 MMOL/L (ref 3.4–5.3)
PROT SERPL-MCNC: 7.9 G/DL (ref 6.8–8.8)
SODIUM SERPL-SCNC: 140 MMOL/L (ref 133–144)
TRIGL SERPL-MCNC: 171 MG/DL
TSH SERPL DL<=0.005 MIU/L-ACNC: 3.9 MU/L (ref 0.4–4)

## 2019-10-02 PROCEDURE — G0438 PPPS, INITIAL VISIT: HCPCS | Performed by: FAMILY MEDICINE

## 2019-10-02 PROCEDURE — 80053 COMPREHEN METABOLIC PANEL: CPT | Performed by: FAMILY MEDICINE

## 2019-10-02 PROCEDURE — G0009 ADMIN PNEUMOCOCCAL VACCINE: HCPCS | Performed by: FAMILY MEDICINE

## 2019-10-02 PROCEDURE — 36415 COLL VENOUS BLD VENIPUNCTURE: CPT | Performed by: FAMILY MEDICINE

## 2019-10-02 PROCEDURE — 80061 LIPID PANEL: CPT | Performed by: FAMILY MEDICINE

## 2019-10-02 PROCEDURE — 90732 PPSV23 VACC 2 YRS+ SUBQ/IM: CPT | Performed by: FAMILY MEDICINE

## 2019-10-02 PROCEDURE — 86803 HEPATITIS C AB TEST: CPT | Performed by: FAMILY MEDICINE

## 2019-10-02 PROCEDURE — 84443 ASSAY THYROID STIM HORMONE: CPT | Performed by: FAMILY MEDICINE

## 2019-10-02 RX ORDER — DESIPRAMINE HYDROCHLORIDE 10 MG/1
10 TABLET ORAL
COMMUNITY
Start: 2019-09-24 | End: 2019-11-12

## 2019-10-02 RX ORDER — CLONAZEPAM 1 MG/1
TABLET ORAL
COMMUNITY
Start: 2019-09-05

## 2019-10-02 ASSESSMENT — ANXIETY QUESTIONNAIRES
6. BECOMING EASILY ANNOYED OR IRRITABLE: NEARLY EVERY DAY
GAD7 TOTAL SCORE: 21
1. FEELING NERVOUS, ANXIOUS, OR ON EDGE: NEARLY EVERY DAY
IF YOU CHECKED OFF ANY PROBLEMS ON THIS QUESTIONNAIRE, HOW DIFFICULT HAVE THESE PROBLEMS MADE IT FOR YOU TO DO YOUR WORK, TAKE CARE OF THINGS AT HOME, OR GET ALONG WITH OTHER PEOPLE: VERY DIFFICULT
2. NOT BEING ABLE TO STOP OR CONTROL WORRYING: NEARLY EVERY DAY
7. FEELING AFRAID AS IF SOMETHING AWFUL MIGHT HAPPEN: NEARLY EVERY DAY
3. WORRYING TOO MUCH ABOUT DIFFERENT THINGS: NEARLY EVERY DAY
5. BEING SO RESTLESS THAT IT IS HARD TO SIT STILL: NEARLY EVERY DAY

## 2019-10-02 ASSESSMENT — PATIENT HEALTH QUESTIONNAIRE - PHQ9: 5. POOR APPETITE OR OVEREATING: NEARLY EVERY DAY

## 2019-10-02 ASSESSMENT — MIFFLIN-ST. JEOR: SCORE: 1044.98

## 2019-10-02 ASSESSMENT — PAIN SCALES - GENERAL: PAINLEVEL: NO PAIN (0)

## 2019-10-02 NOTE — PROGRESS NOTES
.Please place or confirm pending lab orders for  lab appointment on 10/2/19. Patient has an apt with Dr. Fink at 2:00pm Patient fasting.   Thank you,  Natalie

## 2019-10-02 NOTE — PROGRESS NOTES
"  SUBJECTIVE:   Danita Funes is a 74 year old female who presents for Preventive Visit.      Are you in the first 12 months of your Medicare Part B coverage?  No    Physical Health:    In general, how would you rate your overall physical health? good    Outside of work, how many days during the week do you exercise? 2-3 days/week    Outside of work, approximately how many minutes a day do you exercise?30-45 minutes    If you drink alcohol do you typically have >3 drinks per day or >7 drinks per week? No    Do you usually eat at least 4 servings of fruit and vegetables a day, include whole grains & fiber and avoid regularly eating high fat or \"junk\" foods? NO    Do you have any problems taking medications regularly?  No    Do you have any side effects from medications? none    Needs assistance for the following daily activities: no assistance needed    Which of the following safety concerns are present in your home?  none identified     Hearing impairment: No    In the past 6 months, have you been bothered by leaking of urine? no    Mental Health:    In general, how would you rate your overall mental or emotional health? poor  PHQ-2 Score: (!) 4    Do you feel safe in your environment? Yes    Do you have a Health Care Directive? No: Advance care planning was reviewed with patient; patient declined at this time.    Additional concerns to address?  No    Fall risk:  Fallen 2 or more times in the past year?: No  Any fall with injury in the past year?: No  click delete button to remove this line now  Cognitive Screenin) Repeat 3 items (Leader, Season, Table)    2) Clock draw: NORMAL  3) 3 item recall: Recalls 3 objects  Results: 3 items recalled: COGNITIVE IMPAIRMENT LESS LIKELY    Mini-CogTM Copyright JESSICA Lowry. Licensed by the author for use in [facility]; reprinted with permission (hilda@.[facility]). All rights reserved.      Do you have sleep apnea, excessive snoring or daytime drowsiness?: yes, " Depression        Depression and Anxiety Follow-Up    How are you doing with your depression since your last visit? Worsened     How are you doing with your anxiety since your last visit?  Worsened     Are you having other symptoms that might be associated with depression or anxiety? No    Have you had a significant life event? No     Do you have any concerns with your use of alcohol or other drugs? No    Social History     Tobacco Use     Smoking status: Never Smoker     Smokeless tobacco: Never Used   Substance Use Topics     Alcohol use: Yes     Comment: occasional     Drug use: No     No flowsheet data found.  IMTIAZ-7 SCORE 10/2/2019   Total Score 21     No flowsheet data found.      Suicide Assessment Five-step Evaluation and Treatment (SAFE-T)    Reviewed and updated as needed this visit by clinical staff  Tobacco  Allergies  Meds         Reviewed and updated as needed this visit by Provider        Social History     Tobacco Use     Smoking status: Never Smoker     Smokeless tobacco: Never Used   Substance Use Topics     Alcohol use: Yes     Comment: occasional                           Current providers sharing in care for this patient include:   Patient Care Team:  Adrianne Cornell MD as PCP - General (Family Practice)  Adrianne Cornell MD as Assigned PCP    The following health maintenance items are reviewed in Epic and correct as of today:  Health Maintenance   Topic Date Due     DEXA  1945     HEPATITIS C SCREENING  1945     ADVANCE CARE PLANNING  1945     MAMMO SCREENING  1945     LIPID  02/11/1992     MEDICARE ANNUAL WELLNESS VISIT  02/11/2012     FALL RISK ASSESSMENT  02/11/2012     ZOSTER IMMUNIZATION (2 of 3) 09/11/2012     PNEUMOCOCCAL IMMUNIZATION 65+ LOW/MEDIUM RISK (2 of 2 - PPSV23) 10/26/2017     PHQ-2  01/01/2019     INFLUENZA VACCINE (1) 09/01/2019     COLONOSCOPY  06/20/2022     DTAP/TDAP/TD IMMUNIZATION (2 - Td) 10/26/2026     IPV IMMUNIZATION  Aged Out  "    MENINGITIS IMMUNIZATION  Aged Out     Lab work is in process  Pneumonia Vaccine:Adults age 65+ who received Pneumovax (PPSV23) at 65 years or older: Should be given PCV13 > 1 year after their most recent PPSV23    ROS:  Constitutional, HEENT, cardiovascular, pulmonary, GI, , musculoskeletal, neuro, skin, endocrine and psych systems are negative, except as otherwise noted.    OBJECTIVE:   /79 (BP Location: Left arm, Patient Position: Sitting, Cuff Size: Adult Regular)   Pulse 91   Temp 98.2  F (36.8  C) (Oral)   Resp 16   Ht 1.613 m (5' 3.5\")   Wt 55.8 kg (123 lb)   SpO2 97%   BMI 21.45 kg/m   Estimated body mass index is 21.45 kg/m  as calculated from the following:    Height as of this encounter: 1.613 m (5' 3.5\").    Weight as of this encounter: 55.8 kg (123 lb).  EXAM:   GENERAL: healthy, alert and no distress  NECK: no adenopathy, no asymmetry, masses, or scars and thyroid normal to palpation  RESP: lungs clear to auscultation - no rales, rhonchi or wheezes  CV: regular rate and rhythm, normal S1 S2, no S3 or S4, no murmur, click or rub, no peripheral edema and peripheral pulses strong  ABDOMEN: soft, nontender, no hepatosplenomegaly, no masses and bowel sounds normal  MS: no gross musculoskeletal defects noted, no edema    Diagnostic Test Results:  Labs reviewed in Epic    ASSESSMENT / PLAN:   1. Encounter for Medicare annual wellness exam  As below.    2. CARDIOVASCULAR SCREENING; LDL GOAL LESS THAN 160    - Comprehensive metabolic panel (BMP + Alb, Alk Phos, ALT, AST, Total. Bili, TP); Future  - Lipid panel reflex to direct LDL Fasting; Future    3. Screening for diabetes mellitus    - Comprehensive metabolic panel (BMP + Alb, Alk Phos, ALT, AST, Total. Bili, TP); Future    4. Encounter for screening mammogram for breast cancer    - MA SCREENING DIGITAL BILAT - Future  (s+30); Future    5. Need for hepatitis C screening test    - Hepatitis C Screen Reflex to HCV RNA Quant and Genotype; " "Future      6. IMTIAZ (generalized anxiety disorder)  Currently seeing Psychiatry.  - TSH with free T4 reflex; Future    7. Encounter for immunization    - ADMIN 1st VACCINE  - Pneumococcal vaccine 23 valent PPSV23  (Pneumovax) [[id number]]    End of Life Planning:  Patient currently has an advanced directive: No.  I have verified the patient's ablity to prepare an advanced directive/make health care decisions.  Literature was provided to assist patient in preparing an advanced directive.    COUNSELING:  Reviewed preventive health counseling, as reflected in patient instructions       Healthy diet/nutrition       Vision screening    Estimated body mass index is 21.45 kg/m  as calculated from the following:    Height as of this encounter: 1.613 m (5' 3.5\").    Weight as of this encounter: 55.8 kg (123 lb).         reports that she has never smoked. She has never used smokeless tobacco.      Appropriate preventive services were discussed with this patient, including applicable screening as appropriate for cardiovascular disease, diabetes, osteopenia/osteoporosis, and glaucoma.  As appropriate for age/gender, discussed screening for colorectal cancer, prostate cancer, breast cancer, and cervical cancer. Checklist reviewing preventive services available has been given to the patient.    Reviewed patients plan of care and provided an AVS. The Basic Care Plan (routine screening as documented in Health Maintenance) for Danita meets the Care Plan requirement. This Care Plan has been established and reviewed with the Patient.    Counseling Resources:  ATP IV Guidelines  Pooled Cohorts Equation Calculator  Breast Cancer Risk Calculator  FRAX Risk Assessment  ICSI Preventive Guidelines  Dietary Guidelines for Americans, 2010  USDA's MyPlate  ASA Prophylaxis  Lung CA Screening    Geoff Fink MD, MD  [facility]  "

## 2019-10-02 NOTE — NURSING NOTE
Prior to immunization administration, verified patients identity using patient s name and date of birth. Please see Immunization Activity for additional information.     Screening Questionnaire for Adult Immunization    Are you sick today?   No   Do you have allergies to medications, food, a vaccine component or latex?   Yes   Have you ever had a serious reaction after receiving a vaccination?   No   Do you have a long-term health problem with heart disease, lung disease, asthma, kidney disease, metabolic disease (e.g. diabetes), anemia, or other blood disorder?   No   Do you have cancer, leukemia, HIV/AIDS, or any other immune system problem?   No   In the past 3 months, have you taken medications that affect  your immune system, such as prednisone, other steroids, or anticancer drugs; drugs for the treatment of rheumatoid arthritis, Crohn s disease, or psoriasis; or have you had radiation treatments?   No   Have you had a seizure, or a brain or other nervous system problem?   No   During the past year, have you received a transfusion of blood or blood     products, or been given immune (gamma) globulin or antiviral drug?   No   For women: Are you pregnant or is there a chance you could become        pregnant during the next month?   No   Have you received any vaccinations in the past 4 weeks?   Yes     Immunization questionnaire was positive for at least one answer.  Notified provider.        Per orders of Dr. Fink, injection of ppsv 23  given by Jamia Mcgowan MA. Patient instructed to remain in clinic for 15 minutes afterwards, and to report any adverse reaction to me immediately.       Screening performed by Jamia Mcgowan MA on 10/2/2019 at 2:32 PM.

## 2019-10-02 NOTE — PATIENT INSTRUCTIONS
At [facility], we strive to deliver an exceptional experience to you, every time we see you.  If you receive a survey in the mail, please send us back your thoughts. We really do value your feedback.    Your care team:                            Family Medicine Internal Medicine   MD Trenton Richard MD Shantel Branch-Fleming, MD Katya Georgiev PA-C Megan Hill, APRN CNP    Geoff Fink MD Pediatrics   Yoan Peter, EFREN Jones, MD Sirisha Elizondo APRN CNP   MD Vicky Isaacs MD Deborah Mielke, MD Chloé Rao, APRN [facility]      Clinic hours: Monday - Thursday 7 am-7 pm; Fridays 7 am-5 pm.   Urgent care: Monday - Friday 11 am-9 pm; Saturday and Sunday 9 am-5 pm.  Pharmacy : Monday -Thursday 8 am-8 pm; Friday 8 am-6 pm; Saturday and Sunday 9 am-5 pm.     Clinic: [phone]   Pharmacy: [phone]        Patient Education   Personalized Prevention Plan  You are due for the preventive services outlined below.  Your care team is available to assist you in scheduling these services.  If you have already completed any of these items, please share that information with your care team to update in your medical record.  Health Maintenance Due   Topic Date Due     Osteoporosis Screening  1945     Hepatitis C Screening  1945     Discuss Advance Care Planning  1945     Mammogram  1945     Cholesterol Lab  02/11/1992     Annual Wellness Visit  02/11/2012     FALL RISK ASSESSMENT  02/11/2012     Zoster (Shingles) Vaccine (2 of 3) 09/11/2012     Pneumococcal Vaccine (2 of 2 - PPSV23) 10/26/2017     PHQ-2  01/01/2019     Flu Vaccine (1) 09/01/2019

## 2019-10-03 ENCOUNTER — TELEPHONE (OUTPATIENT)
Dept: FAMILY MEDICINE | Facility: CLINIC | Age: 74
End: 2019-10-03

## 2019-10-03 LAB — HCV AB SERPL QL IA: NONREACTIVE

## 2019-10-03 RX ORDER — ATORVASTATIN CALCIUM 40 MG/1
40 TABLET, FILM COATED ORAL DAILY
Qty: 90 TABLET | Refills: 3 | Status: SHIPPED | OUTPATIENT
Start: 2019-10-03 | End: 2023-11-02

## 2019-10-03 ASSESSMENT — ANXIETY QUESTIONNAIRES: GAD7 TOTAL SCORE: 21

## 2019-10-03 NOTE — TELEPHONE ENCOUNTER
returned call    Best number to reach caller:[phone]    Is it ok to leave a detailed message: YES   Transferred to rn line

## 2019-10-03 NOTE — TELEPHONE ENCOUNTER
"Took call from RN line.    Spoke with patient and relayed results as below per Dr. Fink.  Informed patient of plan for patient to start Atorvastatin daily to control elevated cholesterol panel.  Patient stated she did receive a call from her pharmacy today notifying of the prescription and she told pharmacy she was not going to pick it up.  Writer inquired about reason, patient stated \"I've had high cholesterol my whole life and have never taken any medication for it before and I'm not going to start now\".  Writer did relay to patient that due to elevated readings, is at a higher risk of heart disease, heart attack or stroke. Expressed concerns for not managing this.  Patient stated she just has too much going on right now and is not going to add another medication into the mix so is refusing to start at this time.  Writer advised would update provider and call was ended.    Routing to provider, patient refusing to start Atorvastatin. Not open to starting any new medications right now, despite high levels and increased risk.    Lamar Aceves RN      "

## 2019-10-16 ENCOUNTER — TELEPHONE (OUTPATIENT)
Dept: FAMILY MEDICINE | Facility: CLINIC | Age: 74
End: 2019-10-16

## 2019-10-16 DIAGNOSIS — F41.1 GAD (GENERALIZED ANXIETY DISORDER): Primary | ICD-10-CM

## 2019-10-16 NOTE — TELEPHONE ENCOUNTER
Reason for Call:  Other appointment and Referral     Detailed comments: Landon called on behalf of his wife Danita. She was just seen not too long ago and was recommended to see the Psychiatrist here at . They tried to schedule an appointment and was told they needed a referral. Can you please place that referral in and give Landon a call to let him know that it has been done so Danita can get an appointment. Thank you     Phone Number Patient can be reached at: [phone] (R)    Best Time: Any    Can we leave a detailed message on this number? YES    Call taken on 10/16/2019 at 12:03 PM by Jaylin Chatman

## 2019-11-11 ENCOUNTER — TELEPHONE (OUTPATIENT)
Dept: BEHAVIORAL HEALTH | Facility: CLINIC | Age: 74
End: 2019-11-11

## 2019-11-11 ENCOUNTER — TELEPHONE (OUTPATIENT)
Dept: FAMILY MEDICINE | Facility: CLINIC | Age: 74
End: 2019-11-11

## 2019-11-11 NOTE — TELEPHONE ENCOUNTER
Reason for Call:  Other     Detailed comments: Needs call back about changing Psychiatrist to Dr Cortez tomorrow 11/11/19.     Phone Number Patient can be reached at: Home number on file [phone] (home)    Best Time: any    Can we leave a detailed message on this number? YES    Call taken on 11/11/2019 at 4:55 PM by Nury Youssef

## 2019-11-11 NOTE — TELEPHONE ENCOUNTER
Write left a message for Danita asking if she intends to remain with her [facility] provider or switch to [facility].  Writer stated if patient intends to remain with [facility] her visit tomorrow would not have a benefit, no meds will be prescribed or adjusted.    Writer asked patient to call the [facility] to talk with Dr. Fink's team and clarify care intent.    If this visit should be cancelled please let the intake team know at [phone].

## 2019-11-11 NOTE — TELEPHONE ENCOUNTER
Patient left message to return call. Writer called patient and she states she did not like the previous psychiatrist. She discuss with Dr Fink about seeing a new psychiatrist and Dr Fink recommended Wallcielo Cortez. She wants to keep appointment tomorrow 11/12/19 and plans to come a bit earlier in  a few minutes early in case of any paperwork.    Bienvenido Juarez CMA

## 2019-11-12 ENCOUNTER — OFFICE VISIT (OUTPATIENT)
Dept: PSYCHIATRY | Facility: CLINIC | Age: 74
End: 2019-11-12
Attending: FAMILY MEDICINE
Payer: COMMERCIAL

## 2019-11-12 VITALS
BODY MASS INDEX: 21.9 KG/M2 | HEART RATE: 124 BPM | OXYGEN SATURATION: 97 % | TEMPERATURE: 98.4 F | SYSTOLIC BLOOD PRESSURE: 127 MMHG | WEIGHT: 119 LBS | HEIGHT: 62 IN | DIASTOLIC BLOOD PRESSURE: 89 MMHG

## 2019-11-12 DIAGNOSIS — F41.0 PANIC DISORDER WITHOUT AGORAPHOBIA: ICD-10-CM

## 2019-11-12 DIAGNOSIS — F33.1 MODERATE EPISODE OF RECURRENT MAJOR DEPRESSIVE DISORDER (H): ICD-10-CM

## 2019-11-12 DIAGNOSIS — F41.1 GAD (GENERALIZED ANXIETY DISORDER): Primary | ICD-10-CM

## 2019-11-12 PROCEDURE — 90792 PSYCH DIAG EVAL W/MED SRVCS: CPT | Performed by: PSYCHIATRY & NEUROLOGY

## 2019-11-12 RX ORDER — MIRTAZAPINE 7.5 MG/1
7.5 TABLET, FILM COATED ORAL AT BEDTIME
Qty: 30 TABLET | Refills: 0 | Status: SHIPPED | OUTPATIENT
Start: 2019-11-12 | End: 2019-12-16

## 2019-11-12 RX ORDER — QUETIAPINE FUMARATE 25 MG/1
12.5-25 TABLET, FILM COATED ORAL AT BEDTIME
Qty: 30 TABLET | Refills: 1 | Status: SHIPPED | OUTPATIENT
Start: 2019-11-12 | End: 2019-12-16

## 2019-11-12 ASSESSMENT — ANXIETY QUESTIONNAIRES
7. FEELING AFRAID AS IF SOMETHING AWFUL MIGHT HAPPEN: MORE THAN HALF THE DAYS
2. NOT BEING ABLE TO STOP OR CONTROL WORRYING: NEARLY EVERY DAY
1. FEELING NERVOUS, ANXIOUS, OR ON EDGE: NEARLY EVERY DAY
3. WORRYING TOO MUCH ABOUT DIFFERENT THINGS: NEARLY EVERY DAY
5. BEING SO RESTLESS THAT IT IS HARD TO SIT STILL: NEARLY EVERY DAY
4. TROUBLE RELAXING: NEARLY EVERY DAY
GAD7 TOTAL SCORE: 19
6. BECOMING EASILY ANNOYED OR IRRITABLE: MORE THAN HALF THE DAYS
IF YOU CHECKED OFF ANY PROBLEMS ON THIS QUESTIONNAIRE, HOW DIFFICULT HAVE THESE PROBLEMS MADE IT FOR YOU TO DO YOUR WORK, TAKE CARE OF THINGS AT HOME, OR GET ALONG WITH OTHER PEOPLE: VERY DIFFICULT

## 2019-11-12 ASSESSMENT — MIFFLIN-ST. JEOR: SCORE: 1003.03

## 2019-11-12 ASSESSMENT — PAIN SCALES - GENERAL: PAINLEVEL: NO PAIN (0)

## 2019-11-12 ASSESSMENT — PATIENT HEALTH QUESTIONNAIRE - PHQ9: SUM OF ALL RESPONSES TO PHQ QUESTIONS 1-9: 14

## 2019-11-12 NOTE — PROGRESS NOTES
"                                                         Outpatient Psychiatric Evaluation- Standard  Adult    Name:  Danita Funes  : 1945    Source of Referral:  Primary Care Provider: Adrianne Cornell MD   Current Psychotherapist: None     Identifying Data:  Patient is a 74 year old,   White  female  who presents for initial visit with me.  Patient is currently retired. Patient attended the session with Landon puentes , who they agreed to have interview with. Consent for treatment signed and included in electronic medical record. Discussed limits of confidentiality today. My Practice Policy was reviewed and signed. Patient prefers to be called: \"Danita\"    Chief Complaint:  Patient presents with:  Consult: by Dr. Fink for anxiety      HPI:  Danita Funes is a 74 year old female with past history including, hypercholesterolemia, anxiety (IMTIAZ and Panic Disorder) and depression who presents today with worsening mental health sxs. The pt has been followed Dr. Ray ([facility]) for psychiatric care for about 16 years. Pt has one psychiatric hospitalization in  for depression/anxiety and was treated afterwards with a brief ECT series.  She has been maintained for over a decade on Klonopin 0.5-1 mg daily for her anxiety.  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  She is recently tried mirtazapine and desipramine with Dr. Ray.  She had increased difficulty sleeping with desipramine and tapered off of that.  She had a short trial of mirtazapine but stated it caused nightmares and so discontinued.  She is currently only taking Klonopin.  The patient is currently seeking a second psychiatric opinion/psychiatric care outside of Dr. Ray.    Anxiety/Depression started in .  She feels this current " episode of depression and anxiety has been going on the past several weeks.  Her mood has been increasingly down, she has been isolating more, anxiety has been incredibly high.  If her  has to leave the house and she is left alone she reports a few times she has walked around screaming due to the anxiety and feeling frightened.  He tries not to leave her home alone at this point.  The patient has had extreme difficulty sleeping due to her anxiety.  She has trouble falling asleep, will sometimes wake in the middle the night, and will often have early morning awakenings.  Her appetite has decreased.  She does not have any thoughts of suicide or self-harm but also admits that her symptoms need to get better or else she might start feeling that way.  She has 2 young grandchildren whom she loves spending time with.  She denies any recent alcohol or drug use.  She has taken more of her Klonopin than prescribed at times.  She states she last took her Klonopin yesterday and only took 0.5 mg for the whole day.  She will usually take it in the morning and feels that morning and daytime is much harder than evening time.  She will have frequent panic attacks with increased heart rate, shortness of breath, feeling of impending doom, and shakiness.  She will also get tearful often.    The patient reports being very stressed out with having to make a lot of decisions lately because she is the guardian for her sister.  Her sister has Down syndrome and is recently gone blind and is suffering from dementia.  She does state it is starting to get a little easier.    Past diagnoses include: depression, anxiety  Current medications include: has a current medication list which includes the following prescription(s): acetaminophen, clonazepam, mirtazapine, quetiapine, atorvastatin, azithromycin, clonazepam, and lactobacillus rhamnosus (gg).   Medication side effects: Yes: nightmares from mirtazapine  Current stressors include:  Symptoms and Caregiving Stress  Coping mechanisms and supports include: Family    Psychiatric Review of Symptoms:  Depression: See HPI.  She also has some feelings of hopelessness.  Focus and concentration has been difficult also due to her symptoms.   PHQ-9 scores:   PHQ-9 SCORE 11/12/2019   PHQ-9 Total Score 14     Shelly:  Racing Thoughts: Increase   MDQ Score: Negative Screen  Anxiety: Feeling nervous, anxious, or on edge  Uncontrolled worrying  Worrying too much about different things  Trouble relaxing  Restlessness  Thoughts of impending doom    IMTIAZ-7 scores:    IMTIAZ-7 SCORE 10/2/2019 11/12/2019   Total Score 21 19     Panic:  Sweating  Palpitations  Tremors  Sense of Impending Doom  Crying   Agoraphobia:  No.  The patient reports having been diagnosed with agoraphobia but when I explained/described it, she denies that this is the case with her.  PTSD:  No symptoms   OCD:  No symptoms   Psychosis: No symptoms   ADD / ADHD: No symptoms  Gambling or shoplifting: No   Eating Disorder:  No symptoms  Sleep:   Trouble falling asleep  Trouble staying asleep  Early morning awakening  Nightmares/strange dreams     A 12-item WHODAS 2.0 assessment was completed by the patient today and recorded in EPIC.    WHODAS 2.0 Total Score 11/12/2019   Total Score 23       Psychiatric History:   Hospitalizations: [facility] 1997  History of Commitment? No   Past Treatment: counseling, inpatient mental health services, medication(s) from physician / PCP and psychiatry Was seeing [facility] psychiatrist for at least 16 years (Dr. Mindi Ray)  Suicide Attempts: No   Current Suicide Risk: Suicide Assessment Completed Today.  Self-injurious Behavior: Denies  Electroconvulsive Therapy (ECT) or Transcranial Magnetic Stimulation (TMS): Yes 1997, unknown how many sessions but sounds like a brief acute series, for depression/anxiety, some short-term memory loss due to treatment   GeneSight Genetic Testing: No     Brief  series 1997, some short-term memory    Past medication trials include but are not limited to:   Lexapro  Prozac  Klonopin  Desipramine    Substance Use History:  Current Use of Drugs/Alcohol: Alcohol 1-2 12 oz beer per month  Past Use of Drugs/Alcohol: none  Patient reports no problems as a result of their drinking / drug use.   Patient has not received chemical dependency treatment in the past  Recovery Programming Involvement: Not Applicable    Tobacco use: No  Caffeine:  Yes  no caffeine, gave up a long time ago    Based on the clinical interview, there  are not indications of drug or alcohol abuse. Continue to monitor.   Discussed effect of substance use on overall health.   CAGE-AID Score today was: 0     Past Medical History:  Past Medical History:   Diagnosis Date     Anxiety disorder      Diverticulitis of colon 2012     PONV (postoperative nausea and vomiting)       Surgery:   Past Surgical History:   Procedure Laterality Date     COLONOSCOPY  6/20/2012    Procedure: COLONOSCOPY;  Colonoscopy, screening;  Surgeon: Meliton Nguyen MD;  Location: [facility] OR     HERNIA REPAIR  1990    right inguinal hernia repair     OPEN REDUCTION INTERNAL FIXATION WRIST Right 10/27/2016    Procedure: OPEN REDUCTION INTERNAL FIXATION WRIST;  Surgeon: León Mcclendon MD;  Location: [facility] OR     Food and Medicine Allergies:     Allergies   Allergen Reactions     Amoxicillin Rash     Seizures or Head Injury: No  Diet: No Restrictions  Exercise: No regular exercise program  Supplements: Reviewed per Electronic Medical Record Today    Current Medications:    Current Outpatient Medications:      Acetaminophen (TYLENOL EXTRA STRENGTH PO), 2 tabs every 6 hours, Disp: , Rfl:      ClonazePAM (KLONOPIN) 1 MG disintegrating tablet, Take 0.5 mg by mouth 2 times daily as needed., Disp: , Rfl:      mirtazapine (REMERON) 7.5 MG tablet, Take 1 tablet (7.5 mg) by mouth At Bedtime, Disp: 30 tablet, Rfl: 0     QUEtiapine (SEROQUEL) 25 MG tablet,  "Take 0.5-1 tablets (12.5-25 mg) by mouth At Bedtime, Disp: 30 tablet, Rfl: 1     atorvastatin (LIPITOR) 40 MG tablet, Take 1 tablet (40 mg) by mouth daily For cholesterol. (Patient not taking: Reported on 11/12/2019), Disp: 90 tablet, Rfl: 3     azithromycin (ZITHROMAX) 250 MG tablet, Two tablets first day, then one tablet daily for four days. (Patient not taking: Reported on 11/12/2019), Disp: 6 tablet, Rfl: 0     clonazePAM (KLONOPIN) 1 MG tablet, take 1/2- 1 tablet (1mg) by oral route q day as needed, Disp: , Rfl:      Lactobacillus Rhamnosus, GG, (CULTURELLE PO), , Disp: , Rfl:     The [place] Prescription Monitoring Program has been reviewed and there are no concerns about diversionary activity for controlled substances at this time.      Vital Signs:  Vitals: /89 (BP Location: Left arm, Patient Position: Chair, Cuff Size: Adult Regular)   Pulse 124   Temp 98.4  F (36.9  C) (Oral)   Ht 1.575 m (5' 2\")   Wt 54 kg (119 lb)   SpO2 97%   BMI 21.77 kg/m      Labs:  Most recent laboratory results reviewed and the pertinent results include:   Orders Only on 10/02/2019   Component Date Value Ref Range Status     TSH 10/02/2019 3.90  0.40 - 4.00 mU/L Final     Cholesterol 10/02/2019 399* <200 mg/dL Final    Desirable:       <200 mg/dl     Triglycerides 10/02/2019 171* <150 mg/dL Final    Comment: Borderline high:  150-199 mg/dl  High:             200-499 mg/dl  Very high:       >499 mg/dl  Fasting specimen       HDL Cholesterol 10/02/2019 59  >49 mg/dL Final     LDL Cholesterol Calculated 10/02/2019 306* <100 mg/dL Final    Comment: Above desirable:  100-129 mg/dl  Borderline High:  130-159 mg/dL  High:             160-189 mg/dL  Very high:       >189 mg/dl       Non HDL Cholesterol 10/02/2019 340* <130 mg/dL Final    Comment: Above Desirable:  130-159 mg/dl  Borderline high:  160-189 mg/dl  High:             190-219 mg/dl  Very high:       >219 mg/dl       Sodium 10/02/2019 140  133 - 144 mmol/L Final     " Potassium 10/02/2019 4.2  3.4 - 5.3 mmol/L Final     Chloride 10/02/2019 109  94 - 109 mmol/L Final     Carbon Dioxide 10/02/2019 23  20 - 32 mmol/L Final     Anion Gap 10/02/2019 8  3 - 14 mmol/L Final     Glucose 10/02/2019 91  70 - 99 mg/dL Final    Fasting specimen     Urea Nitrogen 10/02/2019 15  7 - 30 mg/dL Final     Creatinine 10/02/2019 1.06* 0.52 - 1.04 mg/dL Final     GFR Estimate 10/02/2019 52* >60 mL/min/[1.73_m2] Final    Comment: Non  GFR Calc  Starting 12/18/2018, serum creatinine based estimated GFR (eGFR) will be   calculated using the Chronic Kidney Disease Epidemiology Collaboration   (CKD-EPI) equation.       GFR Estimate If Black 10/02/2019 60* >60 mL/min/[1.73_m2] Final    Comment:  GFR Calc  Starting 12/18/2018, serum creatinine based estimated GFR (eGFR) will be   calculated using the Chronic Kidney Disease Epidemiology Collaboration   (CKD-EPI) equation.       Calcium 10/02/2019 9.8  8.5 - 10.1 mg/dL Final     Bilirubin Total 10/02/2019 0.8  0.2 - 1.3 mg/dL Final     Albumin 10/02/2019 4.3  3.4 - 5.0 g/dL Final     Protein Total 10/02/2019 7.9  6.8 - 8.8 g/dL Final     Alkaline Phosphatase 10/02/2019 71  40 - 150 U/L Final     ALT 10/02/2019 17  0 - 50 U/L Final     AST 10/02/2019 24  0 - 45 U/L Final     Hepatitis C Antibody 10/02/2019 Nonreactive  NR^Nonreactive Final    Comment: Assay performance characteristics have not been established for newborns,   infants, and children       Most recent EKG from 10/26/16 reviewed. QTc interval 416.      Review of Systems:  10 systems (general, cardiovascular, respiratory, eyes, ENT, endocrine, GI, , M/S, neurological) were reviewed. Most pertinent finding(s) is/are: restlessness . The remaining systems are all unremarkable.    Family History:   Patient reported family history includes:   Family History   Problem Relation Age of Onset     Diabetes Maternal Grandfather      Depression Maternal Grandmother      Mental  Illness History: Yes: sister with Down Syndrome/dementia/blindness; MGM/Mother/Sister all with anxiety  Substance Abuse History: Yes: brother alcohol  Suicide History: Denies  Medications: Unknown     Social History:   Birth place: [place], [place]  Childhood: Yes intact home  Siblings:  one Brother(s),  one Sister(s)  Highest education level was high school graduate.   Employment Status: homemaker, did some   Current Living situation:  Lives in home with . Feels safe at home.  Relationship:  50 years  Children: yes and two grandchildren  Firearms/Weapons Access: there are firearms in the home but pt reports she would have no idea how to use or access; I advised the pt and her  to lock them securely so that the pt does not have any access to them. Pt and  agreeable to plan.    Service: No    Legal History:  No: Patient denies any legal history    Significant Losses / Trauma / Abuse / Neglect Issues:  There are no indications or report of: significant losses, trauma, abuse or neglect.   Issues of possible neglect are not present.   Recommended that patient call 911 or go to the local ED should there be a change in any of these risk factors.    Mental Status Examination:    Appearance: awake, alert, adequately groomed, appeared stated age and moderate distress (anxious and tearful)  Attitude:  cooperative   Eye Contact:  good  Gait and Station: normal, no gross abnormalities noted by observation  Psychomotor Behavior:  Restless, fidgety, psychomotor agitation, NO evidence of tardive dyskinesia, dystonia, or tics  Oriented to:  person, place, time, and situation  Attention Span and Concentration:  Good attention, concentration tough due to sxs  Speech:  clear, coherent, regular rate, rhythm, and volume  Language: intact  Mood:  anxious  Affect:  tearful, anxious  Associations:  no loose associations  Thought Process:  logical, linear and goal oriented  Thought Content:  no evidence  of suicidal ideation or homicidal ideation, no evidence of psychotic thought, no auditory hallucinations present and no visual hallucinations present  Recent and Remote Memory:  Intact to interview. Not formally assessed. No amnesia.  Fund of Knowledge: appropriate  Insight:  fair  Judgment:  intact, adequate for safety  Impulse Control:  intact    Strengths and Opportunities:   Danita Funes identified the following strengths or resources that may help she succeed in counseling: commitment to health and well being, friends / good social support and family support. Things that may interfere with the patient's success include:  transportation concerns.    There are no language or communication issues or need for modification in treatment.   There are no ethnic, cultural or Hoahaoism factors that may be relevant for therapy.  Client identified their preferred language to be English.  Client does not need the assistance of an  or other support involved in therapy.    Suicide Risk Assessment:  Today Danita Funes reports no suicidal ideation. Based on all available evidence including the factors cited above, Danita Funes does not appear to be at imminent risk for self-harm, does not meet criteria for a 72-hr hold, and therefore remains appropriate for ongoing outpatient level of care.  A thorough assessment of risk factors related to suicide and self-harm have been reviewed and are noted above. The patient convincingly denies acute suicidality on several occasions. Local community safety resources reviewed and printed for patient to use if needed. There was no deceit detected, and the patient presented in a manner that was believable.     However, due to patient's extreme anxiety, it was recommended the patient's  ensure his guns are locked up so the pt can't access them.     DSM5  Diagnosis:  296.22 (F32.1)  Major Depressive Disorder, Single Episode, Moderate _ and With anxious distress  300.01  (F41.0) Panic Disorder  300.02 (F41.1) Generalized Anxiety Disorder    Medical Comorbidities Include:   Patient Active Problem List    Diagnosis Date Noted     IMTIAZ (generalized anxiety disorder) 10/02/2019     Priority: Medium     Closed fracture of lower end of right radius with routine healing 12/07/2016     Priority: Medium     CARDIOVASCULAR SCREENING; LDL GOAL LESS THAN 160 04/16/2013     Priority: Medium     Ovarian cyst 06/14/2012     Priority: Medium     Problem list name updated by automated process. Provider to review       Panic disorder with agoraphobia 06/01/2009     Priority: Medium       Impression:  Danita Funes is a 74 year old female with past history including, hypercholesterolemia, anxiety (IMTIAZ and Panic Disorder) and depression who presents today with worsening mental health sxs. The pt has been followed Dr. Ray ([facility]) for psychiatric care for about 16 years. Pt has one psychiatric hospitalization in 1997 for depression/anxiety and was treated afterwards with a brief ECT series.  She has been maintained for over a decade on Klonopin 0.5-1 mg daily for her anxiety.  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  She is recently tried mirtazapine and desipramine with Dr. Ray.  She had increased difficulty sleeping with desipramine and tapered off of that.  She had a short trial of mirtazapine but stated it caused nightmares and so discontinued.  She is currently only taking Klonopin.    At this time, the patient is quite dysphoric throughout the interview with high anxiety and psychomotor agitation.  Her  is incredibly supportive.  The patient seems to currently be suffering a recurrence of her major depressive disorder along with severe generalized anxiety/panic disorder.  Given the patient's  "symptoms, I am not going to stop her clonazepam today, but agree that it is not a good long-term option given her increasing age and geriatric status.  Discussed the risk of falls, confusion, dependence/tolerance.  It is crucial to get the patient sleeping at this point.  Due to her racing thoughts and high anxiety, we will try low-dose Seroquel at bedtime.  She has very high cholesterol and triglycerides, but at this point, low-dose therapy with Seroquel likely outweighs the risks and we will continue to monitor her lipids.  The patient is currently prescribed Lipitor but is refusing to take it because her cholesterol has \"always been like that.\"  I also recommended another trial with mirtazapine.  Hopefully with Seroquel at night she will not experience nightmares from the mirtazapine.  I think mirtazapine would be a good antidepressant and antianxiety choice for her if we can continue to titrate.  In lieu of the Klonopin down the road, we could consider gabapentin to further help with anxiety, and sleep.    Again, I advised the  and the patient to secure the guns in the home and a place for the patient cannot have any access to them.  Although the patient reports not having any idea how to use a gun, everything is online these days and one could easily learn.  She denies suicidal ideation at this time, but given her dysphoria and difficulty regulating her emotional state, I think it is best she does not have any access whatsoever to the guns.  Both patient and  understood my strong recommendation.    Medication side effects and alternatives reviewed. Health promotion activities recommended and reviewed today. All questions addressed. Education and counseling completed regarding risks and benefits of medications and psychotherapy options. Recommend therapy for additional support.     Treatment Plan:    Continue to take Klonopin up to 0.5 mg once daily as needed for anxiety    Take Seroquel 12.5-25 mg " at bedtime for sleep and anxiety. If you would like to take more at bedtime, please call the RNs and they will get in touch with me.     Start mirtazapine 7.5 mg daily at bedtime for depression and anxiety and sleep    Call me/RNs if you like 12.5 mg Seroquel and it doesn't make you too sleepy and you would like to take a dose during the day.       We will continue to monitor hypercholesterolemia since starting an antipsychotic.    In the future, we will consider gabapentin in lieu of Klonopin for further anxiety control.    Consider therapy, CBT.  Also recommended patient consider a partial hospital program if it remains difficult to get her symptoms under control.    Continue all other orders per primary care provider.     Continue all other medications as reviewed per electronic medical record today.     Safety plan reviewed. To the Emergency Department as needed or call after hours crisis line at [phone] or [phone]. [place] Crisis Text Line: Text [place] to 832306  or  Suicide LifeLine Chat: suicideFittingRoom.ThirdMotion/chat    Schedule an appointment with me in 2 weeks or sooner as needed.  Call [facility] at [phone] to schedule.    Follow up with primary care provider as planned or sooner if needed for acute medical concerns.    Call the psychiatric nurse line with medication questions or concerns at [phone].    [employer] may be used to communicate with your provider, but this is not intended to be used for emergencies.    Community Resources:    National Suicide Prevention Lifeline: 555.164.8124 (TTY: 404.301.1190). Call anytime for help.  (www.suicidepreventionlifeline.org)  National [place] on Mental Illness (www.hood.org): 952.315.5340 or 605-618-9220.   Mental Health Association (www.mentalhealth.org): 171.752.6796 or 957-180-3592.  [place] Crisis Text Line: Text [place] to 573353  Suicide LifeLine Chat: suicideFittingRoom.org/chat    Administrative Billing:   Time  spent with patient was 60 minutes and greater than 50% of time or 40 minutes was spent in counseling and coordination of care regarding above diagnoses and treatment plan.    Patient Status:  Patient will continue to be seen for ongoing consultation and stabilization.    Signed:   Marlene Cortez DO  Psychiatry

## 2019-11-13 PROBLEM — F33.1 MODERATE EPISODE OF RECURRENT MAJOR DEPRESSIVE DISORDER (H): Status: ACTIVE | Noted: 2019-11-13

## 2019-11-13 ASSESSMENT — ANXIETY QUESTIONNAIRES: GAD7 TOTAL SCORE: 19

## 2019-11-18 ENCOUNTER — TELEPHONE (OUTPATIENT)
Dept: PSYCHIATRY | Facility: CLINIC | Age: 74
End: 2019-11-18

## 2019-11-18 NOTE — TELEPHONE ENCOUNTER
Reason for Call:  Other call back    Detailed comments: Danita indicated that she is getting sick. Would like to speak with a nurse regarding the if/what side effects she might have if she takes certain medications and to clarify if she can't take any specific cold medications due to her mental health medications.     Phone Number Patient can be reached at: Home number on file [phone] (home)      Can we leave a detailed message on this number? YES    Call taken on 11/18/2019 at 1:22 PM by DELL Moon

## 2019-11-18 NOTE — TELEPHONE ENCOUNTER
Last ov 11/12/19  Next ov 12/3/19    I spoke to Danita. She started both the Mirtazapine and Seroquel at the same time. She denies problems with her medications. She stated she is feeling like she may come down with a cold and wanted to know if certain OTC medications and treatments (cough drops) for colds may interfere or interact with her medication. All questions answered as I could. I encouraged patient to bring her med list to her pharmacy and consult with her pharmacist on which OTC cold medication was best suited for her.     Patient agreed to the plan. She reports she has already received her flu vaccine this year.

## 2019-11-27 ENCOUNTER — TELEPHONE (OUTPATIENT)
Dept: PSYCHIATRY | Facility: CLINIC | Age: 74
End: 2019-11-27

## 2019-11-27 NOTE — TELEPHONE ENCOUNTER
Discussed with patient via phone on 11/21/19 that she could try taking Seroquel 12.5 mg during day for her severe anxiety. Cautioned about excess sedation and potential for falls if she were to take both Seroquel and her Klonopin together due to increase in sedating effects. Also recommended she continue to only take Klonopin when she absolutely needs it. She has been sleeping a lot better on Seroquel at bedtime and does not feel the 12.5 mg makes her feel too tired. Pt scheduled for follow-up 12/03/19.     Marlene Cortez DO on 11/27/2019 at 10:07 AM

## 2019-12-03 ENCOUNTER — OFFICE VISIT (OUTPATIENT)
Dept: PSYCHIATRY | Facility: CLINIC | Age: 74
End: 2019-12-03
Payer: COMMERCIAL

## 2019-12-03 VITALS
WEIGHT: 124 LBS | BODY MASS INDEX: 22.82 KG/M2 | RESPIRATION RATE: 16 BRPM | HEART RATE: 103 BPM | DIASTOLIC BLOOD PRESSURE: 73 MMHG | SYSTOLIC BLOOD PRESSURE: 114 MMHG | HEIGHT: 62 IN | OXYGEN SATURATION: 95 % | TEMPERATURE: 97.8 F

## 2019-12-03 DIAGNOSIS — F33.42 RECURRENT MAJOR DEPRESSIVE DISORDER, IN FULL REMISSION (H): Primary | ICD-10-CM

## 2019-12-03 DIAGNOSIS — F41.1 GAD (GENERALIZED ANXIETY DISORDER): ICD-10-CM

## 2019-12-03 PROCEDURE — 99214 OFFICE O/P EST MOD 30 MIN: CPT | Performed by: PSYCHIATRY & NEUROLOGY

## 2019-12-03 ASSESSMENT — ANXIETY QUESTIONNAIRES
3. WORRYING TOO MUCH ABOUT DIFFERENT THINGS: SEVERAL DAYS
IF YOU CHECKED OFF ANY PROBLEMS ON THIS QUESTIONNAIRE, HOW DIFFICULT HAVE THESE PROBLEMS MADE IT FOR YOU TO DO YOUR WORK, TAKE CARE OF THINGS AT HOME, OR GET ALONG WITH OTHER PEOPLE: SOMEWHAT DIFFICULT
4. TROUBLE RELAXING: SEVERAL DAYS
GAD7 TOTAL SCORE: 4
7. FEELING AFRAID AS IF SOMETHING AWFUL MIGHT HAPPEN: NOT AT ALL
2. NOT BEING ABLE TO STOP OR CONTROL WORRYING: NOT AT ALL
1. FEELING NERVOUS, ANXIOUS, OR ON EDGE: SEVERAL DAYS
5. BEING SO RESTLESS THAT IT IS HARD TO SIT STILL: SEVERAL DAYS
6. BECOMING EASILY ANNOYED OR IRRITABLE: NOT AT ALL

## 2019-12-03 ASSESSMENT — PAIN SCALES - GENERAL: PAINLEVEL: NO PAIN (0)

## 2019-12-03 ASSESSMENT — MIFFLIN-ST. JEOR: SCORE: 1025.71

## 2019-12-03 ASSESSMENT — PATIENT HEALTH QUESTIONNAIRE - PHQ9: SUM OF ALL RESPONSES TO PHQ QUESTIONS 1-9: 3

## 2019-12-03 NOTE — PATIENT INSTRUCTIONS
Treatment Plan:    Continue Seroquel 12.5-25 mg at bedtime. Can continue taking 12.5 mg during day as needed. Caution for increased sedation and fall risk if taken with the daytime Klonopin 0.5 mg.     Continue taking Klonopin 0.5 mg daily as needed for anxiety.      Increase mirtazapine to 15 mg at bedtime.     Continue all other cares per primary care provider.     Continue all other medications as reviewed per electronic medical record today.     Safety plan reviewed. To the Emergency Department as needed or call after hours crisis line at [phone] or [phone]. [place] Crisis Text Line. Text [place] to 288867 or Suicide LifeLine Chat: suicidepreventionlifeline.org/chat    Schedule an appointment with me in 8 weeks or sooner as needed. Call [facility] at [phone] to schedule.    Follow up with primary care provider as planned or for acute medical concerns.    Call the psychiatric nurse line with medication questions or concerns at [phone].    OptiMedicat may be used to communicate with your provider, but this is not intended to be used for emergencies.    At [facility], we strive to deliver an exceptional experience to you, every time we see you.  If you receive a survey in the mail, please send us back your thoughts. We really do value your feedback.    Based on your medical history, these are the current health maintenance/preventive care services that you are due for (some may have been done at this visit.)  Health Maintenance Due   Topic Date Due     DEXA  1945     ADVANCE CARE PLANNING  1945     DEPRESSION ACTION PLAN  1945     MAMMO SCREENING  1945     ZOSTER IMMUNIZATION (1 of 2) 02/11/1997     INFLUENZA VACCINE (1) 09/01/2019         Suggested websites for health information:  Www.[place].org : Up to date and easily searchable information on multiple topics.  Www.medlineplus.gov : medication info, interactive tutorials, watch real  surgeries online  Www.familydoctor.org : good info from the Academy of Family Physicians  Www.cdc.gov : public health info, travel advisories, epidemics (H1N1)  Www.aap.org : children's health info, normal development, vaccinations  Www.health.state.[place].us : [place] dept of health, public health issues in [place], N1N1    Your care team:                            Family Medicine Internal Medicine   MD Trenton Richard MD Shantel Branch-Fleming, MD Katya Georgiev PA-C Nam Ho, MD Pediatrics   EFREN Strickland, JESSA Bustos APRN CNP   MD Vicky Isaacs MD Deborah Mielke, MD Kim Thein, APRN CNP      Clinic hours: Monday - Thursday 7 am-7 pm; Fridays 7 am-5 pm.   Urgent care: Monday - Friday 11 am-9 pm; Saturday and Sunday 9 am-5 pm.  Pharmacy : Monday -Thursday 8 am-8 pm; Friday 8 am-6 pm; Saturday and Sunday 9 am-5 pm.     Clinic: [phone]   Pharmacy: [phone]

## 2019-12-03 NOTE — Clinical Note
This patient is significantly improved in her symptoms.  We will have to continue to watch her cholesterol and triglycerides as I do have her on a very low dose of Seroquel for the time being.  I do hope to taper her off of it soon as I continue to increase her mirtazapine at night for sleep.  I see you have a future/pending lipid panel drawn at this time.  Due to her very drastic improvement in symptoms and sleep (and thus functioning), I am going to continue the Seroquel at least for a little bit longer.  Feel free to read the impression/assessment for more details regarding her treatment plan.  Just wanted to keep you in the loop!Thanks for the referral!  Her and her  are very pleasant!-Marlene

## 2019-12-03 NOTE — PROGRESS NOTES
"    Outpatient Psychiatric Progress Note    Name: Danita Funes   : 1945                    Primary Care Provider: Adrianne Cornell MD   Therapist: ***     PHQ-9 scores:  PHQ-9 SCORE 2019   PHQ-9 Total Score 14       IMTIAZ-7 scores:  IMTIAZ-7 SCORE 10/2/2019 2019   Total Score 21 19       Patient Identification:  Patient is a 74 year old, {[facility] RELATIONSHIP STATUS:660289}  White  female  who presents for return visit with me.  Patient is currently {[facility] EMPLOYMENT:350459}. Patient attended the session {[facility] ATTENDANCE:662759}. Patient prefers to be called: \"***\".    Interim History:  I last saw Danita Funes for outpatient psychiatry {[facility] LAST VISIT:767453} on ***. During that appointment, we *** .     Seroquel 12.5 mg at bedtime    Mirtazapine 7.5    Psychiatric ROS:  Danita Funes reports mood has been: ***  Anxiety has been: ***  Sleep has been: ***  Shelly sxs: ***  Psychosis sxs: ***  ADHD/ADD sxs: ***  PTSD sxs: ***  PHQ9 and GAD7 scores were reviewed today.   Medication side effects: {[facility]:196600}  Current stressors include: {[facility] STRESS:559723}  Coping mechanisms and supports include: {[facility] COPE:149085}    Current medications include:   Current Outpatient Medications   Medication Sig     Acetaminophen (TYLENOL EXTRA STRENGTH PO) 2 tabs every 6 hours     ClonazePAM (KLONOPIN) 1 MG disintegrating tablet Take 0.5 mg by mouth 2 times daily as needed.     clonazePAM (KLONOPIN) 1 MG tablet take 1/2- 1 tablet (1mg) by oral route q day as needed     Lactobacillus Rhamnosus, GG, (CULTURELLE PO)      mirtazapine (REMERON) 7.5 MG tablet Take 1 tablet (7.5 mg) by mouth At Bedtime     QUEtiapine (SEROQUEL) 25 MG tablet Take 0.5-1 tablets (12.5-25 mg) by mouth At Bedtime     atorvastatin (LIPITOR) 40 MG tablet Take 1 tablet (40 mg) by mouth daily For cholesterol. (Patient not taking: Reported on 2019)     azithromycin (ZITHROMAX) 250 MG tablet Two tablets first day, then one " "tablet daily for four days. (Patient not taking: Reported on 11/12/2019)     No current facility-administered medications for this visit.        The [place] Prescription Monitoring Program has been reviewed and there are no concerns about diversionary activity for controlled substances at this time. ***    Previous medication trials include but not limited to:  ***    Past Medical/Surgical History:  Past Medical History:   Diagnosis Date     Anxiety disorder      Diverticulitis of colon 2012     PONV (postoperative nausea and vomiting)       has a past medical history of Anxiety disorder, Diverticulitis of colon (2012), and PONV (postoperative nausea and vomiting).    Social History:  Current Living situation:  {[facility] TOWN:256267} with {[facility] FAMILY:149276}.  Current use of drugs or alcohol: {[facility] SUBSTANCES:440259::\"Denies\"}   Tobacco use: {[facility] YES/NO:373540}    Vital Signs:   /73 (BP Location: Left arm, Patient Position: Sitting, Cuff Size: Adult Regular)   Pulse 103   Temp 97.8  F (36.6  C) (Oral)   Resp 16   Ht 1.575 m (5' 2\")   Wt 56.2 kg (124 lb)   LMP  (LMP Unknown)   SpO2 95%   Breastfeeding No   BMI 22.68 kg/m      Labs:  Most recent laboratory results reviewed and the pertinent results include: ***  Most recent laboratory results reviewed and no new labs. ***    Review of Systems:  10 systems (general, cardiovascular, respiratory, eyes, ENT, endocrine, GI, , M/S, neurological) were reviewed. Most pertinent finding(s) is/are: *** . The remaining systems are all unremarkable.    Mental Status Examination:  Appearance: awake, alert, adequately groomed, appeared stated age and no apparent distress  Attitude:  cooperative   Eye Contact:  good  Gait and Station: normal, no gross abnormalities noted by observation  Psychomotor Behavior:  no evidence of tardive dyskinesia, dystonia, or tics  Oriented to:  person, place, time, and situation  Attention Span and Concentration:  normal  Speech:  " clear, coherent, regular rate, rhythm, and volume  Language: intact  Mood:  { :975426}  Affect:  { :781171}  Associations:  no loose associations  Thought Process:  logical, linear and goal oriented  Thought Content:  no evidence of suicidal ideation or homicidal ideation, no evidence of psychotic thought, no auditory hallucinations present and no visual hallucinations present  Recent and Remote Memory:  Intact to interview. Not formally assessed. No amnesia.  Fund of Knowledge: appropriate  Insight:  { :466189}  Judgment:  { :543543}, adequate for safety  Impulse Control:  { :250931}    Suicide Risk Assessment:  Today Danita Funes reports ***. In addition, there are notable risk factors for self-harm, including {[facility] SUICIDE RISKS:348710}. However, risk is mitigated by {[facility] SUICIDE PROTECT:405542}. Therefore, based on all available evidence including the factors cited above, Danita Funes does not appear to be at imminent risk for self-harm, does not meet criteria for a 72-hr hold, and therefore remains appropriate for ongoing outpatient level of care.  A thorough assessment of risk factors related to suicide and self-harm have been reviewed and are noted above. The patient convincingly denies suicidality on several occasions. Local community safety resources printed and reviewed for patient to use if needed. There was no deceit detected, and the patient presented in a manner that was believable.     DSM5 Diagnosis:  {DSM5  Diagnosis:663133}    Medical comorbidities include:   Patient Active Problem List    Diagnosis Date Noted     Moderate episode of recurrent major depressive disorder (H) 11/13/2019     Priority: Medium     IMTIAZ (generalized anxiety disorder) 10/02/2019     Priority: Medium     Closed fracture of lower end of right radius with routine healing 12/07/2016     Priority: Medium     CARDIOVASCULAR SCREENING; LDL GOAL LESS THAN 160 04/16/2013     Priority: Medium     Ovarian cyst 06/14/2012      Priority: Medium     Problem list name updated by automated process. Provider to review       Panic disorder without agoraphobia 06/01/2009     Priority: Medium       Psychosocial & Contextual Factors:  {[facility] hospital PSYCHOSOCIAL FACTORS:440498}    Assessment:  Dantia Funes reports ***.     Medication side effects and alternatives were reviewed. Health promotion activities recommended and reviewed today. All questions addressed. Education and counseling completed regarding risks and benefits of medications and psychotherapy options. Recommend therapy for additional support.     Treatment Plan:    Continue ***    Discontinue ***    Start ***    Continue *** per primary care provider.     Continue all other medications as reviewed per electronic medical record today.     Safety plan reviewed. To the Emergency Department as needed or call after hours crisis line at [phone] or [phone]. [place] Crisis Text Line. Text [place] to 405358 or Suicide LifeLine Chat: suicidepreventionBestSecret.comline.org/chat    Continue therapy as planned. ***    Schedule an appointment with me in *** weeks or sooner as needed. Call [facility] at [phone] to schedule.    Follow up with primary care provider as planned or for acute medical concerns.    Call the psychiatric nurse line with medication questions or concerns at [phone].    PlayMotionhart may be used to communicate with your provider, but this is not intended to be used for emergencies.    Administrative Billing:   Time spent with patient was 30 minutes and greater than 50% of time or 20 minutes was spent in counseling and coordination of care regarding above diagnoses and treatment plan.    Patient Status:  {[facility]:084541}    Signed:   Marlene Cortez DO  Psychiatry

## 2019-12-03 NOTE — PROGRESS NOTES
"    Outpatient Psychiatric Progress Note    Name: Danita Funes   : 1945                    Primary Care Provider: Adrianne Cornell MD   Therapist: None     PHQ-9 scores:  PHQ-9 SCORE 2019 12/3/2019   PHQ-9 Total Score 14 3       IMTIAZ-7 scores:  IMTIAZ-7 SCORE 10/2/2019 2019 12/3/2019   Total Score 21 19 4       Patient Identification:  Patient is a 74 year old,   White  female  who presents for return visit with me.  Patient is currently retired. Patient attended the session with  , who they agreed to have interview with. Patient prefers to be called: \"Danita\".    Interim History:  I last saw Danita Funes for outpatient psychiatry Consultation on 19. During that appointment, we started Seroquel at bedtime for sleep and anxiety and mirtazapine 7.5 mg at bedtime. Pt called and and I was agreeable to pt taking 12.5 Seroquel during the day to help with her anxiety. She was instructed to cut back on her Klonopin use as much as possible.      The patient reports doing much better today.  She has been taking Seroquel 12.5 milligrams at bedtime with her mirtazapine 7.5 mg.  During the day she continues to take her Klonopin 0.5 mg.  She feels this regimen is working very well for her.  Her anxiety is under much better control.  She is not having episodes of tearfulness anymore.  Her mood has improved.  She is sleeping great 99% of the time she says.  She feels she is able to process her thoughts and emotions in a much more rational manner.  She still worries about her sister but is able to manage her anxiety and worries about her sister much better.  She denies panic attacks.  Her appetite has been increased.  The patient and her  are looking forward to spending time with family for [place].    Her and her  had a good Thanksgiving and spent some time in [place] visiting family.    Psychiatric ROS:  Danita Funes reports mood has been: Much " better  Anxiety has been: Much better  Sleep has been: Much better  Shelly sxs: Denies  Psychosis sxs: Denies  ADHD/ADD sxs: None  PTSD sxs: None  PHQ9 and GAD7 scores were reviewed today.   Medication side effects: Yes: Increased appetite  Current stressors include: Being a legal guardian for her sister who has Down syndrome and dementia  Coping mechanisms and supports include: Family, Hobbies and Friends    Current medications include:   Current Outpatient Medications   Medication Sig     Acetaminophen (TYLENOL EXTRA STRENGTH PO) 2 tabs every 6 hours     ClonazePAM (KLONOPIN) 1 MG disintegrating tablet Take 0.5 mg by mouth 2 times daily as needed.     clonazePAM (KLONOPIN) 1 MG tablet take 1/2- 1 tablet (1mg) by oral route q day as needed     Lactobacillus Rhamnosus, GG, (CULTURELLE PO)      mirtazapine (REMERON) 7.5 MG tablet Take 1 tablet (7.5 mg) by mouth At Bedtime     QUEtiapine (SEROQUEL) 25 MG tablet Take 0.5-1 tablets (12.5-25 mg) by mouth At Bedtime     atorvastatin (LIPITOR) 40 MG tablet Take 1 tablet (40 mg) by mouth daily For cholesterol. (Patient not taking: Reported on 11/12/2019)     azithromycin (ZITHROMAX) 250 MG tablet Two tablets first day, then one tablet daily for four days. (Patient not taking: Reported on 11/12/2019)     No current facility-administered medications for this visit.        Previous medication trials include but not limited to:  Lexapro  Prozac  Klonopin  Desipramine    Past Medical/Surgical History:  Past Medical History:   Diagnosis Date     Anxiety disorder      Diverticulitis of colon 2012     PONV (postoperative nausea and vomiting)       has a past medical history of Anxiety disorder, Diverticulitis of colon (2012), and PONV (postoperative nausea and vomiting).    Social History:  Current Living situation: Patient lives at home with her .  Feels safe at home.  Current use of drugs or alcohol: Denies   Tobacco use: No    Vital Signs:   /73 (BP Location: Left  "arm, Patient Position: Sitting, Cuff Size: Adult Regular)   Pulse 103   Temp 97.8  F (36.6  C) (Oral)   Resp 16   Ht 1.575 m (5' 2\")   Wt 56.2 kg (124 lb)   LMP  (LMP Unknown)   SpO2 95%   Breastfeeding No   BMI 22.68 kg/m      Labs:  Most recent laboratory results reviewed and no new labs.     Review of Systems:  10 systems (general, cardiovascular, respiratory, eyes, ENT, endocrine, GI, , M/S, neurological) were reviewed. Most pertinent finding(s) is/are: Patient no longer experiencing restlessness, no chest pain, no shortness of breath, no headaches, no constipation/diarrhea, no pain. The remaining systems are all unremarkable.    Mental Status Examination:  Appearance: awake, alert, adequately groomed, appeared stated age and no apparent distress  Attitude:  cooperative   Eye Contact:  good  Gait and Station: normal, no gross abnormalities noted by observation  Psychomotor Behavior:  no evidence of tardive dyskinesia, dystonia, or tics  Oriented to:  person, place, time, and situation  Attention Span and Concentration:  normal  Speech:  clear, coherent, regular rate, rhythm, and volume  Language: intact  Mood:  better  Affect:  appropriate and in normal range and mood congruent  Associations:  no loose associations  Thought Process:  logical, linear and goal oriented  Thought Content:  no evidence of suicidal ideation or homicidal ideation, no evidence of psychotic thought, no auditory hallucinations present and no visual hallucinations present  Recent and Remote Memory:  Intact to interview. Not formally assessed. No amnesia.  Fund of Knowledge: appropriate  Insight:  good  Judgment:  intact, adequate for safety  Impulse Control:  intact    Suicide Risk Assessment:  Today Danita Funes reports no suicidal ideation. Based on all available evidence including the factors cited above, Danita Funes does not appear to be at imminent risk for self-harm, does not meet criteria for a 72-hr hold, and " therefore remains appropriate for ongoing outpatient level of care.  A thorough assessment of risk factors related to suicide and self-harm have been reviewed and are noted above. The patient convincingly denies suicidality on several occasions. Local community safety resources printed and reviewed for patient to use if needed. There was no deceit detected, and the patient presented in a manner that was believable.     DSM5 Diagnosis:  296.32 (F33.1) Major Depressive Disorder, Recurrent Episode, Moderate, in full remission  300.02 (F41.1) Generalized Anxiety Disorder   Panic disorder, in remission    Medical comorbidities include:   Patient Active Problem List    Diagnosis Date Noted     Moderate episode of recurrent major depressive disorder (H) 11/13/2019     Priority: Medium     IMTIAZ (generalized anxiety disorder) 10/02/2019     Priority: Medium     Closed fracture of lower end of right radius with routine healing 12/07/2016     Priority: Medium     CARDIOVASCULAR SCREENING; LDL GOAL LESS THAN 160 04/16/2013     Priority: Medium     Ovarian cyst 06/14/2012     Priority: Medium     Problem list name updated by automated process. Provider to review       Panic disorder without agoraphobia 06/01/2009     Priority: Medium       Psychosocial & Contextual Factors: Being a legal guardian for her sister who has Down syndrome and dementia    Assessment:  Danita Funes reports major improvement in her symptoms of anxiety and depression.  The patient reports sleeping great and 99% of the time while taking Seroquel and mirtazapine at bedtime.  I believe a lot of her anxiety is decreased due to her sleeping better at night.  I also feel the medications are likely helping.  She currently has a lot more insight into her symptoms and is thinking much more clearly due to her decreased anxiety.    I would like to continue titrating her mirtazapine to a higher dose as to be more effective for her anxiety and mood.  It would be  nice to be able to taper and discontinue Seroquel at some point due to her high cholesterol and high triglycerides.  At this time she is just taking 12.5 mg at bedtime and her cholesterol continues to be monitored.  She is taking a statin in an attempt to decrease cholesterol.  It would also be nice to continue to taper her Klonopin.  We discussed increased risk for falls, confusion, and sedation with Klonopin/benzodiazepine use.  There are no red flags for abuse at this time.  We will continue to titrate mirtazapine in the hopes of also being able to get rid of Klonopin altogether.  Perhaps down the road, a combination of gabapentin and therapeutic dose of mirtazapine might be a good option.  At this time, I do not want to make too many changes due to her current improvement and stability.    If she gets too tired with the increase of mirtazapine to 15 mg at bedtime, could consider discontinuing the 12.5 mg of Seroquel.     Medication side effects and alternatives were reviewed. Health promotion activities recommended and reviewed today. All questions addressed. Education and counseling completed regarding risks and benefits of medications and psychotherapy options. Recommend therapy for additional support.     Treatment Plan:    Continue Seroquel 12.5-25 mg at bedtime. Can continue taking 12.5 mg during day as needed. Caution for increased sedation and fall risk if taken with the daytime Klonopin 0.5 mg.     Continue taking Klonopin 0.5 mg daily as needed for anxiety.      Increase mirtazapine to 15 mg at bedtime.     Continue all other cares per primary care provider.     Continue all other medications as reviewed per electronic medical record today.     Safety plan reviewed. To the Emergency Department as needed or call after hours crisis line at [phone] or [phone]. [place] Crisis Text Line. Text [place] to 575232 or Suicide LifeLine Chat: suicidepreLoomialine.org/chat    Schedule an appointment with  me in 8 weeks or sooner as needed. Call [facility] at [phone] to schedule.    Follow up with primary care provider as planned or for acute medical concerns.    Call the psychiatric nurse line with medication questions or concerns at [phone].    MyChart may be used to communicate with your provider, but this is not intended to be used for emergencies.    Patient Status:  Patient will continue to be seen for ongoing consultation and stabilization.    Signed:   Marlene Cortez DO  Psychiatry

## 2019-12-04 ASSESSMENT — ANXIETY QUESTIONNAIRES: GAD7 TOTAL SCORE: 4

## 2019-12-16 ENCOUNTER — TELEPHONE (OUTPATIENT)
Dept: PSYCHIATRY | Facility: CLINIC | Age: 74
End: 2019-12-16

## 2019-12-16 DIAGNOSIS — F41.0 PANIC DISORDER WITHOUT AGORAPHOBIA: Primary | ICD-10-CM

## 2019-12-16 DIAGNOSIS — F41.1 GAD (GENERALIZED ANXIETY DISORDER): ICD-10-CM

## 2019-12-16 RX ORDER — MIRTAZAPINE 7.5 MG/1
7.5 TABLET, FILM COATED ORAL AT BEDTIME
Qty: 30 TABLET | Refills: 0 | Status: SHIPPED | OUTPATIENT
Start: 2019-12-16 | End: 2019-12-23

## 2019-12-16 RX ORDER — QUETIAPINE FUMARATE 25 MG/1
12.5-25 TABLET, FILM COATED ORAL AT BEDTIME
Qty: 45 TABLET | Refills: 0 | Status: SHIPPED | OUTPATIENT
Start: 2019-12-16 | End: 2019-12-23

## 2019-12-16 NOTE — TELEPHONE ENCOUNTER
Reason for Call:  Medication     Do you use a [facility]?  Name of the pharmacy and phone number for the current request:  [facility] in [place], [phone]    Name of the medication requested: unknown    Other request: Pt was told to call back if having any issues with needing medications. Please contact pt to discuss    Can we leave a detailed message on this number? YES    Phone number patient can be reached at: Home number on file [phone] (home)    Best Time: any    Call taken on 12/16/2019 at 11:12 AM by Josiah Longo

## 2019-12-16 NOTE — TELEPHONE ENCOUNTER
I would recommend that she try Seroquel 12.5 mg if she still needs it at this point.  I would not recommend making any major changes to her medications based on one bad day.  Thank you!

## 2019-12-16 NOTE — TELEPHONE ENCOUNTER
"Danita Funes is a 74 year old female who calls with depressive symptoms  and anxiety.    NURSING ASSESSMENT:  Description:  Patient ammy, reports anxiety and depression \"for no reason at all\"  Onset/duration:  This morning-- states she woke up like this  Precip. factors:  Patient denied any changes to home life, medications, family or relationships-- also denied any history of trauma/bad memories around this time of year-- states she is sleeping \"really well\"  Psych History: diagnoses of panic disorder without agoraphobia, IMTIAZ and moderate recurrent depressive disorder    MEDICATIONS:   Taking medication(s) as prescribed? Yes  Any medication side effects? No significant side effects    Any barriers to taking medication(s) as prescribed?  No  Medication(s) improving/managing symptoms?  No-- reports clonazepam is not effective today      Last visit:  12/3/19  Imminent danger to self:  no.   Imminent danger to others:  no.   Impaired reality:  no  Is patient home alone?  No, with       NURSING PLAN: Routed to covering providers as  is out of the office.   No note of further medication adjustments in last note, as patient was sleeping better and reporting improvement of symptoms.    RECOMMENDED DISPOSITION:  Home care advice - will call back with recommendations from provider  Will comply with recommendation: Yes    Guideline used:  Telephone Triage Protocols for Nurses, Fifth Edition, Lana Pascal RN  12/16/19  2:02 PM    "

## 2019-12-23 ENCOUNTER — TELEPHONE (OUTPATIENT)
Dept: PSYCHIATRY | Facility: CLINIC | Age: 74
End: 2019-12-23

## 2019-12-23 DIAGNOSIS — F41.0 PANIC DISORDER WITHOUT AGORAPHOBIA: ICD-10-CM

## 2019-12-23 DIAGNOSIS — F41.1 GAD (GENERALIZED ANXIETY DISORDER): ICD-10-CM

## 2019-12-23 RX ORDER — MIRTAZAPINE 7.5 MG/1
7.5 TABLET, FILM COATED ORAL AT BEDTIME
Qty: 30 TABLET | Refills: 1 | Status: SHIPPED | OUTPATIENT
Start: 2019-12-23

## 2019-12-23 RX ORDER — QUETIAPINE FUMARATE 25 MG/1
TABLET, FILM COATED ORAL
Qty: 90 TABLET | Refills: 1 | Status: SHIPPED | OUTPATIENT
Start: 2019-12-23 | End: 2023-11-02

## 2019-12-23 NOTE — TELEPHONE ENCOUNTER
See TE from 12/16 for assessment of symptoms.    suggested using PRN seroquel to help with symptoms.   Patient calling back now reporting that there has been no improvement in her symptoms.     Any suggestions?    Shreya Pascal RN  12/23/19  12:53 PM

## 2019-12-23 NOTE — TELEPHONE ENCOUNTER
Spoke with patient and her  via telephone.  Patient confirms she increased her bedtime mirtazapine to 15 mg after her visit on 12/3.  She also started taking half a tablet to a full tablet (25 mg) of Seroquel during the day to help with her daytime anxiety.  The patient felt as though she had been starting to do a little bit better until her really bad day on 12/16/2019 when I called to speak with the psychiatric RN about options to help with her worsening anxiety.  My colleague who was covering recommended continuing with daytime as needed Seroquel and also assured them not to base further improvement or lack thereof on one bad day.    Today the patient was weepy and very anxious. It seems things have been progressively worsening again. They both denied any safety concerns and the  is with her 24/7 to care for her.  I did suggest they go to the ED for an evaluation for inpatient psychiatric care if their concerns about safety were to change or the patient continues to worsen and is unable to care for herself.  I also recommended partial hospital program for seniors.  The patient and her  declined a referral at this time.  I did advise that it is very likely the patient is in need of a higher level of care than what outpatient can provide at this time for suitable and timely symptom improvement. The patient has also declined to work with an individual therapist.    As far as medication changes go, I suggested increasing the bedtime dose of Seroquel to 25 to 50 mg at night with continued low dose (12.5-25 mg) PRN during day.  Since the patient and her  felt she was doing better on just the 7.5 mg of mirtazapine and are unsure if she started to worsen because of the increased dose, we decreased mirtazapine back to 7.5 mg in light of increasing Seroquel.  I advised to follow-up as soon as possible. Also discussed watching for increased sedation. Advised pt's  to keep medications in a  safe place away from the patient if he were to have any safety concerns regarding self-harm.     They still have an appointment with her past psychiatrist, Dr. Ray, on January 7 that they are considering keeping since they have been working with her for 16 years.  At this point, it may be the best option to continue with longitudinal psychiatric care with someone who knows her well.  They told me they would keep us updated in order to prevent med changes from multiple mental health care providers.    Marlene Cortez, DO on 12/23/2019 at 1:32 PM

## 2019-12-23 NOTE — TELEPHONE ENCOUNTER
Reason for Call:  Medication or medication refill:    Do you use a [facility]?  Name of the pharmacy and phone number for the current request: [facility] [id number] [place] [facility] [id number] [place] [address]   Name of the medication requested: mirtazapine (REMERON) 7.5 MG tablet    Other request: [facility] asking for [facility] meds     Can we leave a detailed message on this number? YES    Phone number patient can be reached at: [phone]  Best Time: asap    Call taken on 12/23/2019 at 12:07 PM by Marco Dunn

## 2022-08-15 ENCOUNTER — TRANSFERRED RECORDS (OUTPATIENT)
Dept: HEALTH INFORMATION MANAGEMENT | Facility: CLINIC | Age: 77
End: 2022-08-15

## 2022-09-15 ENCOUNTER — TRANSFERRED RECORDS (OUTPATIENT)
Dept: HEALTH INFORMATION MANAGEMENT | Facility: CLINIC | Age: 77
End: 2022-09-15

## 2022-09-20 ENCOUNTER — TRANSFERRED RECORDS (OUTPATIENT)
Dept: HEALTH INFORMATION MANAGEMENT | Facility: CLINIC | Age: 77
End: 2022-09-20

## 2022-11-14 ENCOUNTER — TRANSFERRED RECORDS (OUTPATIENT)
Dept: HEALTH INFORMATION MANAGEMENT | Facility: CLINIC | Age: 77
End: 2022-11-14

## 2023-03-16 ENCOUNTER — TRANSFERRED RECORDS (OUTPATIENT)
Dept: HEALTH INFORMATION MANAGEMENT | Facility: CLINIC | Age: 78
End: 2023-03-16

## 2023-07-17 ENCOUNTER — TRANSFERRED RECORDS (OUTPATIENT)
Dept: HEALTH INFORMATION MANAGEMENT | Facility: CLINIC | Age: 78
End: 2023-07-17
Payer: COMMERCIAL

## 2023-09-08 ENCOUNTER — TRANSFERRED RECORDS (OUTPATIENT)
Dept: HEALTH INFORMATION MANAGEMENT | Facility: CLINIC | Age: 78
End: 2023-09-08
Payer: COMMERCIAL

## 2023-11-02 ENCOUNTER — OFFICE VISIT (OUTPATIENT)
Dept: FAMILY MEDICINE | Facility: CLINIC | Age: 78
End: 2023-11-02
Payer: COMMERCIAL

## 2023-11-02 VITALS
OXYGEN SATURATION: 99 % | HEIGHT: 63 IN | RESPIRATION RATE: 16 BRPM | SYSTOLIC BLOOD PRESSURE: 112 MMHG | DIASTOLIC BLOOD PRESSURE: 71 MMHG | BODY MASS INDEX: 18.96 KG/M2 | TEMPERATURE: 97.5 F | WEIGHT: 107 LBS | HEART RATE: 94 BPM

## 2023-11-02 DIAGNOSIS — Z01.818 PREOP GENERAL PHYSICAL EXAM: Primary | ICD-10-CM

## 2023-11-02 DIAGNOSIS — F33.1 MODERATE EPISODE OF RECURRENT MAJOR DEPRESSIVE DISORDER (H): ICD-10-CM

## 2023-11-02 LAB
ALT SERPL W P-5'-P-CCNC: 15 U/L (ref 0–50)
CREAT SERPL-MCNC: 1.07 MG/DL (ref 0.51–0.95)
EGFRCR SERPLBLD CKD-EPI 2021: 54 ML/MIN/1.73M2
HGB BLD-MCNC: 13 G/DL (ref 11.7–15.7)
PLATELET # BLD AUTO: 246 10E3/UL (ref 150–450)

## 2023-11-02 PROCEDURE — 85049 AUTOMATED PLATELET COUNT: CPT | Performed by: INTERNAL MEDICINE

## 2023-11-02 PROCEDURE — 82565 ASSAY OF CREATININE: CPT | Performed by: INTERNAL MEDICINE

## 2023-11-02 PROCEDURE — 99204 OFFICE O/P NEW MOD 45 MIN: CPT | Performed by: INTERNAL MEDICINE

## 2023-11-02 PROCEDURE — 84460 ALANINE AMINO (ALT) (SGPT): CPT | Performed by: INTERNAL MEDICINE

## 2023-11-02 PROCEDURE — 36415 COLL VENOUS BLD VENIPUNCTURE: CPT | Performed by: INTERNAL MEDICINE

## 2023-11-02 PROCEDURE — 85018 HEMOGLOBIN: CPT | Performed by: INTERNAL MEDICINE

## 2023-11-02 RX ORDER — GABAPENTIN 100 MG/1
CAPSULE ORAL
COMMUNITY
Start: 2023-01-05

## 2023-11-02 RX ORDER — RESPIRATORY SYNCYTIAL VIRUS VACCINE 120MCG/0.5
0.5 KIT INTRAMUSCULAR ONCE
Qty: 1 EACH | Refills: 0 | Status: CANCELLED | OUTPATIENT
Start: 2023-11-02 | End: 2023-11-02

## 2023-11-02 ASSESSMENT — PATIENT HEALTH QUESTIONNAIRE - PHQ9
10. IF YOU CHECKED OFF ANY PROBLEMS, HOW DIFFICULT HAVE THESE PROBLEMS MADE IT FOR YOU TO DO YOUR WORK, TAKE CARE OF THINGS AT HOME, OR GET ALONG WITH OTHER PEOPLE: NOT DIFFICULT AT ALL
SUM OF ALL RESPONSES TO PHQ QUESTIONS 1-9: 2
SUM OF ALL RESPONSES TO PHQ QUESTIONS 1-9: 2

## 2023-11-02 ASSESSMENT — PAIN SCALES - GENERAL: PAINLEVEL: NO PAIN (0)

## 2023-11-02 NOTE — PROGRESS NOTES
Declined Annual wellness today   [address] [address]  Phone: [phone]  Primary Provider: Adrianne Cornell  Pre-op Performing Provider: STEPH BOYCE    PREOPERATIVE EVALUATION:  Today's date: 11/2/2023    Danita is a 78 year old female who presents for a preoperative evaluation.    Surgical Information:  Surgery/Procedure: Cataracts, bilateral  Surgery Location: [facility]   Surgeon: Dr Hunter  Surgery Date: 11/14/2023  (left) and 11/21/2023 (right)  Time of Surgery: NA  Where patient plans to recover: At home with family  Fax number for surgical facility: [phone]      HPI related to upcoming procedure:             This is a 78 year old female who comes in today for a preoperative evaluation. Mrs. Funes has bilateral cataracts. She is scheduled to undergo cataract surgery on the left eye on November 14, 2023 and cataract surgery on the right eye on November 21, 2023.          11/2/2023    12:24 PM   Preop Questions   1. Have you ever had a heart attack or stroke? No   2. Have you ever had surgery on your heart or blood vessels, such as a stent placement, a coronary artery bypass, or surgery on an artery in your head, neck, heart, or legs? No   3. Do you have chest pain with activity? No   4. Do you have a history of  heart failure? No   5. Do you currently have a cold, bronchitis or symptoms of other infection? No   6. Do you have a cough, shortness of breath, or wheezing? No   7. Do you or anyone in your family have previous history of blood clots? No   8. Do you or does anyone in your family have a serious bleeding problem such as prolonged bleeding following surgeries or cuts? No   9. Have you ever had problems with anemia or been told to take iron pills? No   10. Have you had any abnormal blood loss such as black, tarry or bloody stools, or abnormal vaginal bleeding? No   11. Have you ever had a  blood transfusion? No   12. Are you willing to have a blood transfusion if it is medically needed before, during, or after your surgery? Yes   13. Have you or any of your relatives ever had problems with anesthesia? No   14. Do you have sleep apnea, excessive snoring or daytime drowsiness? No   15. Do you have any artifical heart valves or other implanted medical devices like a pacemaker, defibrillator, or continuous glucose monitor? No   16. Do you have artificial joints? No   17. Are you allergic to latex? No     Health Care Directive:  Patient does not have a Health Care Directive or Living Will: But patient wants FULL CODE.    Preoperative Review of :   reviewed - controlled substances reflected in medication list.    REVIEW OF SYSTEMS  CONSTITUTIONAL: NEGATIVE for fever, chills, change in weight  INTEGUMENTARY/SKIN: NEGATIVE for worrisome rashes, moles or lesions  EYES: NEGATIVE for vision changes or irritation  ENT/MOUTH: NEGATIVE for ear, mouth and throat problems  RESP: NEGATIVE for significant cough or SOB  CV: NEGATIVE for chest pain, palpitations or peripheral edema  GI: NEGATIVE for nausea, abdominal pain, heartburn, or change in bowel habits  : NEGATIVE for frequency, dysuria, or hematuria  MUSCULOSKELETAL: NEGATIVE for significant arthralgias or myalgia  NEURO: POSITIVE for history of right posterior fossa meningioma, S/P radiation, completed last Nov. 2022.  ENDOCRINE: NEGATIVE for temperature intolerance, skin/hair changes  HEME: NEGATIVE for bleeding problems  PSYCHIATRIC: POSITIVE fordepression and anxiety, currently on Mirtazapine, Gabapentin and Clonazepam.    Patient Active Problem List    Diagnosis Date Noted    Moderate episode of recurrent major depressive disorder (H) 11/13/2019     Priority: Medium    IMTIAZ (generalized anxiety disorder) 10/02/2019     Priority: Medium    Closed fracture of lower end of right radius with routine healing 12/07/2016     Priority: Medium     CARDIOVASCULAR SCREENING; LDL GOAL LESS THAN 160 04/16/2013     Priority: Medium    Ovarian cyst 06/14/2012     Priority: Medium     Problem list name updated by automated process. Provider to review      Panic disorder without agoraphobia 06/01/2009     Priority: Medium      Past Medical History:   Diagnosis Date    Anxiety disorder     Diverticulitis of colon 2012    PONV (postoperative nausea and vomiting)      Past Surgical History:   Procedure Laterality Date    COLONOSCOPY  6/20/2012    Procedure: COLONOSCOPY;  Colonoscopy, screening;  Surgeon: Meliton Nguyen MD;  Location: [facility] OR    HERNIA REPAIR  1990    right inguinal hernia repair    OPEN REDUCTION INTERNAL FIXATION WRIST Right 10/27/2016    Procedure: OPEN REDUCTION INTERNAL FIXATION WRIST;  Surgeon: León Mcclendon MD;  Location: [facility] OR     Current Outpatient Medications   Medication Sig Dispense Refill    Acetaminophen (TYLENOL EXTRA STRENGTH PO) 2 tabs every 6 hours      atorvastatin (LIPITOR) 40 MG tablet Take 1 tablet (40 mg) by mouth daily For cholesterol. (Patient not taking: Reported on 11/12/2019) 90 tablet 3    azithromycin (ZITHROMAX) 250 MG tablet Two tablets first day, then one tablet daily for four days. (Patient not taking: Reported on 11/12/2019) 6 tablet 0    ClonazePAM (KLONOPIN) 1 MG disintegrating tablet Take 0.5 mg by mouth 2 times daily as needed.      clonazePAM (KLONOPIN) 1 MG tablet take 1/2- 1 tablet (1mg) by oral route q day as needed      Lactobacillus Rhamnosus, GG, (CULTURELLE PO)       mirtazapine (REMERON) 7.5 MG tablet Take 1 tablet (7.5 mg) by mouth At Bedtime 30 tablet 1    QUEtiapine (SEROQUEL) 25 MG tablet Take one to two tabs (25-50 mg) by mouth daily at bedtime and can take half tab to one tab (12.5-25 mg) during day as needed for anxiety. 90 tablet 1       Allergies   Allergen Reactions    Amoxicillin Rash        Social History     Tobacco Use    Smoking status: Never    Smokeless tobacco: Never   Substance Use  "Topics    Alcohol use: Yes     Comment: occasional     OBJECTIVE  /71 (BP Location: Right arm, Patient Position: Sitting, Cuff Size: Adult Regular)   Pulse 94   Temp 97.5  F (36.4  C) (Oral)   Resp 16   Ht 1.588 m (5' 2.5\")   Wt 48.5 kg (107 lb)   LMP  (LMP Unknown)   SpO2 99%   BMI 19.26 kg/m     Physical Exam  GENERAL APPEARANCE: healthy, alert and no distress  HENT: normal cephalic/atraumatic  RESP: lungs clear to auscultation - no rales, rhonchi or wheezes  CV: regular rates and rhythm and no murmur, click or rub  MS: extremities normal- no gross deformities noted  NEURO: Normal strength and tone, mentation intact, and speech normal  PSYCH: mentation appears normal and affect normal/bright    No results for input(s): \"HGB\", \"PLT\", \"INR\", \"NA\", \"POTASSIUM\", \"CR\", \"A1C\" in the last 72531 hours.     Diagnostics:  11/2/2023 11:04 PM     Component Value Flag Ref Range Units Status   Creatinine 1.07  High  0.51 - 0.95 mg/dL Final   GFR Estimate 54  Low  >60 mL/min/1.73m2 Final   11/2/2023 11:04 PM     Component Value Flag Ref Range Units Status   ALT 15  0 - 50 U/L Final     Component  Ref Range & Units 8 d ago 10 yr ago     Platelet Count  150 - 450 10e3/uL 246 256 R   Resulting Agency [facility]      1/2/2023  1:36 PM     Component Value Flag Ref Range Units Status   Hemoglobin 13.0  11.7 - 15.7 g/dL Final           Specimen Collected: 11/02/23  1:34 PM Last Resulted: 11/02/23  1:36 PM           No EKG required for low risk surgery (cataract, skin procedure, breast biopsy, etc).    ASSESSMENT/PLAN  Preop general physical exam  Revised Cardiac Risk Index (RCRI):  The patient has the following serious cardiovascular risks for perioperative complications:   - No serious cardiac risks = 0 points   RCRI Interpretation: 0 points: Class I (very low risk - 0.4% complication rate)  Patient is cleared for surgery.  - REVIEW OF HEALTH MAINTENANCE PROTOCOL ORDERS  - Hemoglobin  - Platelet count  - ALT  - " Creatinine    Moderate episode of recurrent major depressive disorder (H)  Stable with current regimen.        Signed Electronically by: Trenton Sahu MD  Copy of this evaluation report is provided to requesting physician.      Answers submitted by the patient for this visit:  Patient Health Questionnaire (Submitted on 11/2/2023)  If you checked off any problems, how difficult have these problems made it for you to do your work, take care of things at home, or get along with other people?: Not difficult at all  PHQ9 TOTAL SCORE: 2  Answers submitted by the patient for this visit:  Patient Health Questionnaire (Submitted on 11/2/2023)  If you checked off any problems, how difficult have these problems made it for you to do your work, take care of things at home, or get along with other people?: Not difficult at all  PHQ9 TOTAL SCORE: 2

## 2023-11-02 NOTE — PATIENT INSTRUCTIONS
Preparing for Your Surgery  Getting started  A nurse will call you to review your health history and instructions. They will give you an arrival time based on your scheduled surgery time. Please be ready to share:  Your doctor's clinic name and phone number  Your medical, surgical, and anesthesia history  A list of allergies and sensitivities  A list of medicines, including herbal treatments and over-the-counter drugs  Whether the patient has a legal guardian (ask how to send us the papers in advance)  Please tell us if you're pregnant--or if there's any chance you might be pregnant. Some surgeries may injure a fetus (unborn baby), so they require a pregnancy test. Surgeries that are safe for a fetus don't always need a test, and you can choose whether to have one.   If you have a child who's having surgery, please ask for a copy of Preparing for Your Child's Surgery.    Preparing for surgery  Within 10 to 30 days of surgery: Have a pre-op exam (sometimes called an H&P, or History and Physical). This can be done at a clinic or pre-operative center.  If you're having a , you may not need this exam. Talk to your care team.  At your pre-op exam, talk to your care team about all medicines you take. If you need to stop any medicines before surgery, ask when to start taking them again.  We do this for your safety. Many medicines can make you bleed too much during surgery. Some change how well surgery (anesthesia) drugs work.  Call your insurance company to let them know you're having surgery. (If you don't have insurance, call [phone].)  Call your clinic if there's any change in your health. This includes signs of a cold or flu (sore throat, runny nose, cough, rash, fever). It also includes a scrape or scratch near the surgery site.  If you have questions on the day of surgery, call your hospital or surgery center.  Eating and drinking guidelines  For your safety: Unless your surgeon tells you otherwise,  follow the guidelines below.  Eat and drink as usual until 8 hours before you arrive for surgery. After that, no food or milk.  Drink clear liquids until 2 hours before you arrive. These are liquids you can see through, like water, Gatorade, and Propel Water. They also include plain black coffee and tea (no cream or milk), candy, and breath mints. You can spit out gum when you arrive.  If you drink alcohol: Stop drinking it the night before surgery.  If your care team tells you to take medicine on the morning of surgery, it's okay to take it with a sip of water.  Preventing infection  Shower or bathe the night before and morning of your surgery. Follow the instructions your clinic gave you. (If no instructions, use regular soap.)  Don't shave or clip hair near your surgery site. We'll remove the hair if needed.  Don't smoke or vape the morning of surgery. You may chew nicotine gum up to 2 hours before surgery. A nicotine patch is okay.  Note: Some surgeries require you to completely quit smoking and nicotine. Check with your surgeon.  Your care team will make every effort to keep you safe from infection. We will:  Clean our hands often with soap and water (or an alcohol-based hand rub).  Clean the skin at your surgery site with a special soap that kills germs.  Give you a special gown to keep you warm. (Cold raises the risk of infection.)  Wear special hair covers, masks, gowns and gloves during surgery.  Give antibiotic medicine, if prescribed. Not all surgeries need antibiotics.  What to bring on the day of surgery  Photo ID and insurance card  Copy of your health care directive, if you have one  Glasses and hearing aids (bring cases)  You can't wear contacts during surgery  Inhaler and eye drops, if you use them (tell us about these when you arrive)  CPAP machine or breathing device, if you use them  A few personal items, if spending the night  If you have . . .  A pacemaker, ICD (cardiac defibrillator) or other  implant: Bring the ID card.  An implanted stimulator: Bring the remote control.  A legal guardian: Bring a copy of the certified (court-stamped) guardianship papers.  Please remove any jewelry, including body piercings. Leave jewelry and other valuables at home.  If you're going home the day of surgery  You must have a responsible adult drive you home. They should stay with you overnight as well.  If you don't have someone to stay with you, and you aren't safe to go home alone, we may keep you overnight. Insurance often won't pay for this.  After surgery  If it's hard to control your pain or you need more pain medicine, please call your surgeon's office.  Questions?   If you have any questions for your care team, list them here: _________________________________________________________________________________________________________________________________________________________________________ ____________________________________ ____________________________________ ____________________________________  For informational purposes only. Not to replace the advice of your health care provider. Copyright   2003, 2019 [employer]. All rights reserved. Clinically reviewed by Jewels Marcelino MD. [employer] 772541 - REV 12/22.    How to Take Your Medication Before Surgery    On the morning of surgery, only take Clonazepam, at least more than 4 hours before surgery.

## 2023-11-07 NOTE — PATIENT INSTRUCTIONS
Treatment Plan:    Continue to take Klonopin up to 0.5 mg once daily as needed for anxiety    Take Seroquel 12.5-25 mg at bedtime for sleep and anxiety. If you would like to take more at bedtime, please call the RNs and they will get in touch with me.     Start mirtazapine 7.5 mg daily at bedtime for depression and anxiety and sleep    Call me/RNs if you like 12.5 mg Seroquel and it doesn't make you too sleepy and you would like to take a dose during the day.       Consider therapy, CBT.     Continue all other orders per primary care provider.     Continue all other medications as reviewed per electronic medical record today.     Safety plan reviewed. To the Emergency Department as needed or call after hours crisis line at [phone] or [phone]. [place] Crisis Text Line: Text [place] to 950238  or  Suicide LifeLine Chat: suicideShareholder InSite.org/chat    Schedule an appointment with me in 2 weeks or sooner as needed.  Call [facility] at [phone] to schedule.    Follow up with primary care provider as planned or sooner if needed for acute medical concerns.    Call the psychiatric nurse line with medication questions or concerns at [phone].    [employer]ot may be used to communicate with your provider, but this is not intended to be used for emergencies.    Community Resources:    National Suicide Prevention Lifeline: 553.393.2779 (TTY: 593.828.4469). Call anytime for help.  (www.suicidepreventionlifeline.org)  National [place] [employer] Mental Illness (www.hood.org): 585.232.9971 or 473-592-5784.   Mental Health Association (www.mentalhealth.org): 759.368.3640 or 287-125-1955.  [place] Crisis Text Line: Text [place] to 689517  Suicide LifeLine Chat: suicideShareholder InSite.org/chat        At [facility], we strive to deliver an exceptional experience to you, every time we see you.  If you receive a survey in the mail, please send us back your thoughts. We really do value  your feedback.    Based on your medical history, these are the current health maintenance/preventive care services that you are due for (some may have been done at this visit.)  Health Maintenance Due   Topic Date Due     DEXA  1945     ADVANCE CARE PLANNING  1945     MAMMO SCREENING  1945     ZOSTER IMMUNIZATION (2 of 3) 09/11/2012     INFLUENZA VACCINE (1) 09/01/2019         Suggested websites for health information:  Www.Syntricity.org : Up to date and easily searchable information on multiple topics.  Www.medlineplus.gov : medication info, interactive tutorials, watch real surgeries online  Www.familydoctor.org : good info from the Academy of Family Physicians  Www.cdc.gov : public health info, travel advisories, epidemics (H1N1)  Www.aap.org : children's health info, normal development, vaccinations  Www.health.state.[place].us : [place] dept of health, public health issues in [place], N1N1    Your care team:                            Family Medicine Internal Medicine   MD Trenton Richard MD Shantel Branch-Fleming, MD Katya Georgiev PA-C Nam Ho, MD Pediatrics   EFREN Strickland, JESSA Bustos APRN CNP   MD Vicky Isaacs MD Deborah Mielke, MD Kim Thein, APRN CNP      Clinic hours: Monday - Thursday 7 am-7 pm; Fridays 7 am-5 pm.   Urgent care: Monday - Friday 11 am-9 pm; Saturday and Sunday 9 am-5 pm.  Pharmacy : Monday -Thursday 8 am-8 pm; Friday 8 am-6 pm; Saturday and Sunday 9 am-5 pm.     Clinic: [phone]   Pharmacy: [phone]     General Sunscreen Counseling: I recommended a broad spectrum sunscreen with a SPF of 30 or higher.  I explained that SPF 30 sunscreens block approximately 97 percent of the sun's harmful rays.  Sunscreens should be applied at least 15 minutes prior to expected sun exposure and then every 2 hours after that as long as sun exposure continues. If swimming or exercising sunscreen should be reapplied every 45 minutes to an hour after getting wet or sweating.  One ounce, or the equivalent of a shot glass full of sunscreen, is adequate to protect the skin not covered by a bathing suit. I also recommended a lip balm with a sunscreen as well. Sun protective clothing can be used in lieu of sunscreen but must be worn the entire time you are exposed to the sun's rays. Detail Level: Detailed

## 2023-11-30 ENCOUNTER — VIRTUAL VISIT (OUTPATIENT)
Dept: FAMILY MEDICINE | Facility: CLINIC | Age: 78
End: 2023-11-30
Payer: COMMERCIAL

## 2023-11-30 DIAGNOSIS — U07.1 INFECTION DUE TO 2019 NOVEL CORONAVIRUS: Primary | ICD-10-CM

## 2023-11-30 PROCEDURE — 99442 PR PHYSICIAN TELEPHONE EVALUATION 11-20 MIN: CPT | Mod: 93 | Performed by: FAMILY MEDICINE

## 2023-11-30 NOTE — PROGRESS NOTES
Danita is a 78 year old who is being evaluated via a billable telephone visit.      What phone number would you like to be contacted at? [phone]  How would you like to obtain your AVS? Mail a copy    Distant Location (provider location):  On-site    5:30 PM      A/P:      ICD-10-CM    1. Infection due to 2019 novel coronavirus  U07.1 nirmatrelvir and ritonavir (PAXLOVID) 150 mg/100 mg therapy pack        Discussed Paxlovid, role in reduction of risk of severe disease, hospitalization and death.  Also discussed potential to shorten course of symptoms by an unknown amount.  Reviewed side effects and drug drug interactions.    Advised potential interaction with mirtazapine and clonazepam.  Consider trying lower dose of clonazepam until she knows how it will effect her.    Last 2 GFR <60 so renal dosing sent    Reviewed isolation guidelines and when to seek additional care        Subjective   Danita is a 78 year old, presenting for the following health issues:  Covid Concern      HPI       COVID-19 Symptom Review  How many days ago did these symptoms start? Symptoms started last night, positive rapid test today     Are any of the following symptoms significant for you?  New or worsening difficulty breathing? No  Worsening cough? Yes, it's a dry cough.   Fever or chills? Yes, the highest temperature was 102.1  Headache: YES  Sore throat: No  Chest pain: No  Diarrhea: No  Body aches? YES  Fatigue     What treatments has patient tried? Acetaminophen   Does patient live in a nursing home, group home, or shelter? No  Does patient have a way to get food/medications during quarantined? Yes, I have a friend or family member who can help me.          Started developing symptoms of Covid last evening.  Positive test this afternoon.    Has been having fever and body aches.  Feels weak and tired.  Mild occasional cough.  No respiratory symptoms      Review of Systems         Objective           Vitals:  No vitals were obtained  today due to virtual visit.    Physical Exam   alert and no distress  PSYCH: Alert and oriented times 3; coherent speech, normal   rate and volume, able to articulate logical thoughts, able   to abstract reason, no tangential thoughts, no hallucinations   or delusions  Her affect is normal  RESP: No cough, no audible wheezing, able to talk in full sentences  Remainder of exam unable to be completed due to telephone visits    Epic reviewed          5:41 PM    Phone call duration: 11 minutes

## 2023-12-19 ENCOUNTER — TELEPHONE (OUTPATIENT)
Dept: FAMILY MEDICINE | Facility: CLINIC | Age: 78
End: 2023-12-19
Payer: COMMERCIAL

## 2023-12-19 NOTE — LETTER
December 19, 2023    Danita Funes  [id number]  [facility] [place] [address]    Dear Danita,    At [facility] we care about your health and are committed to providing quality patient care.     Here is a list of Health Maintenance topics that are due now or due soon:  Health Maintenance Due   Topic Date Due    DEXA  Never done    ADVANCE CARE PLANNING  Never done    DEPRESSION ACTION PLAN  Never done    ZOSTER IMMUNIZATION (1 of 2) Never done    RSV VACCINE (Pregnancy & 60+) (1 - 1-dose 60+ series) Never done    DTAP/TDAP/TD IMMUNIZATION (1 - Tdap) 10/27/2016    MEDICARE ANNUAL WELLNESS VISIT  10/02/2020    COVID-19 Vaccine (4 - 2023-24 season) 09/01/2023        We are recommending that you:  Schedule a WELLNESS (Preventative/Physical) APPOINTMENT with your primary care provider. If you go elsewhere for your wellness appointments then please disregard this reminder     and   Schedule a Nurse-Only appointment to update your immunizations: Your records indicate that you are not up to date with your immunizations, please schedule a nurse-only appointment to get these updated or update them at your next office visit. If this is incorrect, please disregard.    To schedule an appointment or discuss this further, you may contact us by phone at the [facility] at [phone] or online through the [email or web address] @ [email or web address]    Thank you for trusting [facility] and we appreciate the opportunity to serve you.  We look forward to supporting your healthcare needs in the future.    Your partners in health,      Quality Committee at [facility]

## 2023-12-19 NOTE — TELEPHONE ENCOUNTER
Patient Quality Outreach    Patient is due for the following:   Physical Preventive Adult Physical      Topic Date Due    Zoster (Shingles) Vaccine (1 of 2) Never done    Diptheria Tetanus Pertussis (DTAP/TDAP/TD) Vaccine (1 - Tdap) 10/27/2016    COVID-19 Vaccine (4 - 2023-24 season) 09/01/2023       Next Steps:   Schedule a Adult Preventative    Type of outreach:    Sent letter.      Questions for provider review:    None           Bria Montaño MA

## 2024-02-12 ENCOUNTER — TRANSFERRED RECORDS (OUTPATIENT)
Dept: HEALTH INFORMATION MANAGEMENT | Facility: CLINIC | Age: 79
End: 2024-02-12
Payer: COMMERCIAL

## 2024-04-30 ENCOUNTER — NURSE TRIAGE (OUTPATIENT)
Dept: NURSING | Facility: CLINIC | Age: 79
End: 2024-04-30
Payer: COMMERCIAL

## 2024-04-30 NOTE — TELEPHONE ENCOUNTER
COVID Positive/Requesting COVID treatment    Patient is positive for COVID and requesting treatment options.    Date of positive COVID test (PCR or at home)? 04/30/2024  Current COVID symptoms: muscle or body aches and headache  Date COVID symptoms began: 04/30/2024    Message should be routed to clinic RN pool. Best phone number to use for call back: [phone]        who has consent on file is spoken to.  is given protocol specific care advise and call back indications for pt/wife. verbalized understanding and agreement with plan of care and no further questions at this time.   Reason for Disposition   [1] HIGH RISK patient (e.g., weak immune system, age > 64 years, obesity with BMI 30 or higher, pregnant, chronic lung disease or other chronic medical condition) AND [2] COVID symptoms (e.g., cough, fever)  (Exceptions: Already seen by PCP and no new or worsening symptoms.)    Additional Information   Negative: SEVERE difficulty breathing (e.g., struggling for each breath, speaks in single words)   Negative: Difficult to awaken or acting confused (e.g., disoriented, slurred speech)   Negative: Bluish (or gray) lips or face now   Negative: Shock suspected (e.g., cold/pale/clammy skin, too weak to stand, low BP, rapid pulse)   Negative: Sounds like a life-threatening emergency to the triager   Negative: SEVERE or constant chest pain or pressure  (Exception: Mild central chest pain, present only when coughing.)   Negative: MODERATE difficulty breathing (e.g., speaks in phrases, SOB even at rest, pulse 100-120)   Negative: [1] Headache AND [2] stiff neck (can't touch chin to chest)   Negative: Oxygen level (e.g., pulse oximetry) 90 percent or lower   Negative: Chest pain or pressure  (Exception: MILD central chest pain, present only when coughing.)   Negative: [1] Drinking very little AND [2] dehydration suspected (e.g., no urine > 12 hours, very dry mouth, very lightheaded)   Negative: Patient  sounds very sick or weak to the triager   Negative: MILD difficulty breathing (e.g., minimal/no SOB at rest, SOB with walking, pulse <100)   Negative: Fever > 103 F (39.4 C)   Negative: [1] Fever > 101 F (38.3 C) AND [2] age > 60 years   Negative: [1] Fever > 100.0 F (37.8 C) AND [2] bedridden (e.g., CVA, chronic illness, recovering from surgery)   Negative: Oxygen level (e.g., pulse oximetry) 91 to 94 percent    Protocols used: Coronavirus (COVID-19) Diagnosed or [id number]

## 2024-05-01 NOTE — TELEPHONE ENCOUNTER
RN tried calling patient's phone number and left VM to return call to clinic at [phone]    RN called [place],  and spoke with him. Consent to communicate on file. He reported that patient is currently resting and will call clinic back shortly.      If patient calls back, please triage current symptoms and go through COVID protocol.     Nicole Liz RN

## 2024-05-01 NOTE — TELEPHONE ENCOUNTER
Patient calling to report that she was feeling better today and didn't feel she needed Paxlovid. Writer notified that if she was feeling better and was improving, then she would be OK to pass on the Paxlovid and to call clinic back if for some reason her symptoms started to worsen again.  Patient verbalized understanding and had no other questions or concerns at this time.     Bhavin Byers RN  [facility]

## 2024-09-09 ENCOUNTER — TRANSFERRED RECORDS (OUTPATIENT)
Dept: HEALTH INFORMATION MANAGEMENT | Facility: CLINIC | Age: 79
End: 2024-09-09
Payer: COMMERCIAL

## 2025-03-05 ENCOUNTER — TRANSFERRED RECORDS (OUTPATIENT)
Dept: HEALTH INFORMATION MANAGEMENT | Facility: CLINIC | Age: 80
End: 2025-03-05
Payer: COMMERCIAL